# Patient Record
Sex: MALE | Race: WHITE | NOT HISPANIC OR LATINO | Employment: OTHER | URBAN - NONMETROPOLITAN AREA
[De-identification: names, ages, dates, MRNs, and addresses within clinical notes are randomized per-mention and may not be internally consistent; named-entity substitution may affect disease eponyms.]

---

## 2017-06-18 ENCOUNTER — OFFICE VISIT (OUTPATIENT)
Dept: URGENT CARE | Facility: PHYSICIAN GROUP | Age: 47
End: 2017-06-18
Payer: MEDICAID

## 2017-06-18 VITALS
BODY MASS INDEX: 25.61 KG/M2 | OXYGEN SATURATION: 98 % | SYSTOLIC BLOOD PRESSURE: 136 MMHG | TEMPERATURE: 97.3 F | HEIGHT: 68 IN | DIASTOLIC BLOOD PRESSURE: 94 MMHG | RESPIRATION RATE: 14 BRPM | HEART RATE: 80 BPM | WEIGHT: 169 LBS

## 2017-06-18 DIAGNOSIS — G89.4 CHRONIC PAIN SYNDROME: ICD-10-CM

## 2017-06-18 DIAGNOSIS — M62.830 MUSCLE SPASM OF BACK: Primary | ICD-10-CM

## 2017-06-18 PROCEDURE — 99214 OFFICE O/P EST MOD 30 MIN: CPT | Performed by: PHYSICIAN ASSISTANT

## 2017-06-18 RX ORDER — KETOROLAC TROMETHAMINE 30 MG/ML
60 INJECTION, SOLUTION INTRAMUSCULAR; INTRAVENOUS ONCE
Status: COMPLETED | OUTPATIENT
Start: 2017-06-18 | End: 2017-06-18

## 2017-06-18 RX ADMIN — KETOROLAC TROMETHAMINE 60 MG: 30 INJECTION, SOLUTION INTRAMUSCULAR; INTRAVENOUS at 09:59

## 2017-06-18 ASSESSMENT — ENCOUNTER SYMPTOMS
PARESTHESIAS: 0
BACK PAIN: 1
MYALGIAS: 1
PERIANAL NUMBNESS: 0
BOWEL INCONTINENCE: 0
TINGLING: 0
GASTROINTESTINAL NEGATIVE: 1
NUMBNESS: 0

## 2017-06-18 ASSESSMENT — PAIN SCALES - GENERAL: PAINLEVEL: 9=SEVERE PAIN

## 2017-06-18 NOTE — PROGRESS NOTES
Subjective:      Singh Torres is a 47 y.o. male who presents with Back Pain    PMH:  has a past medical history of Elbow injury; Head injury (2004); Migraine with aura; Backpain (1991); Restless leg syndrome; ASTHMA; Vertigo; Heart burn; Indigestion; Snoring; Arthritis; Hemorrhagic disorder (CMS-[facility]); Bowel habit changes; NATALIE (obstructive sleep apnea) (1/30/2012); Pneumonia (2004); Breath shortness; and Dental disorder. He also has no past medical history of Diabetes.  MEDS:   Current outpatient prescriptions:   •  doxycycline (VIBRAMYCIN) 100 MG Tab, Take 1 Tab by mouth 2 times a day., Disp: 20 Tab, Rfl: 0  •  maalox plus-benadryl-visc lidocaine (MAGIC MOUTHWASH), Take 5 mL by mouth every 6 hours as needed., Disp: 90 mL, Rfl: 0  •  amoxicillin-clavulanate (AUGMENTIN) 875-125 MG Tab, Take 1 Tab by mouth 2 times a day., Disp: 20 Tab, Rfl: 0  •  cyclobenzaprine (FLEXERIL) 10 MG Tab, Take 1 Tab by mouth 3 times a day as needed., Disp: 30 Tab, Rfl: 0  •  Olopatadine HCl 0.2 % Solution, 1 Drop by Ophthalmic route 2 Times a Day., Disp: 1 Bottle, Rfl: 1  •  aripiprazole (ABILIFY) 10 MG Tab, Take 10 mg by mouth every day., Disp: , Rfl:   •  hydrocodone/acetaminophen (NORCO)  MG Tab, Take 1 Tab by mouth every four hours as needed. Indications: Moderate to Moderately Severe Pain, Disp: , Rfl:   •  sumatriptan (IMITREX) 100 MG tablet, Take 100 mg by mouth Once PRN for Migraine., Disp: , Rfl:   •  topiramate (TOPAMAX) 50 MG tablet, Take 100 mg by mouth every bedtime., Disp: , Rfl:   •  zolpidem (AMBIEN) 10 MG Tab, Take 10 mg by mouth at bedtime as needed for Sleep., Disp: , Rfl:   •  Multiple Vitamins-Minerals (MULTIVITAMIN GUMMIES ADULT PO), Take 2 Tabs by mouth every day., Disp: , Rfl:   •  Multiple Vitamins-Minerals (AIRBORNE) Chew Tab, Take 2 Tabs by mouth every day., Disp: , Rfl:   •  Ibuprofen-Diphenhydramine Cit (ADVIL PM PO), Take 1 Tab by mouth as needed (Pt states for sleep)., Disp: , Rfl:   •  meclizine  (ANTIVERT) 25 MG Tab, Take 1 Tab by mouth 3 times a day as needed., Disp: 30 Tab, Rfl: 0  •  ondansetron (ZOFRAN) 4 MG TABS, Take 1 Tab by mouth every four hours as needed for Nausea/Vomiting., Disp: 20 Tab, Rfl: 0  •  Lidocaine HCl 0.5 % GEL, Apply 1 Application to affected area(s) 3 times a day. Pt uses it for mouth sores., Disp: , Rfl:   •  tramadol (ULTRAM) 50 MG TABS, Take  mg by mouth every four hours as needed for Moderate Pain., Disp: , Rfl:   •  pramipexole (MIRAPEX) 0.25 MG TABS, Take 0.25 mg by mouth every bedtime., Disp: , Rfl:   •  diclofenac EC (VOLTAREN) 75 MG Tablet Delayed Response, Take 1 Tab by mouth 2 times a day as needed (pain)., Disp: 30 Tab, Rfl: 0  •  albuterol (PROVENTIL) 2.5mg/3ml Nebu Soln solution for nebulization, 2.5 mg by Nebulization route every four hours as needed for Shortness of Breath., Disp: , Rfl:   •  albuterol (VENTOLIN OR PROVENTIL) 108 (90 BASE) MCG/ACT Aero Soln inhalation aerosol, Inhale 2 Puffs by mouth every four hours as needed for Shortness of Breath., Disp: , Rfl:   ALLERGIES: No Known Allergies  SURGHX:   Past Surgical History   Procedure Laterality Date   • Other  2006     Nasal - deviated septum   • Other  2007     colonoscopy   • Turbinoplasty Right 6/28/2016     Procedure: TURBINOPLASTY;  Surgeon: Zaki Yu M.D.;  Location: SURGERY SAME DAY [facility];  Service:      SOCHX:  reports that he has never smoked. He has never used smokeless tobacco. He reports that he does not drink alcohol or use illicit drugs.  FH: family history includes Cancer in his paternal grandmother. Reviewed with patient/family. Not pertinent to this complaint.            HPI Comments: Patient presents with:  Back Pain: x today / Lower mid back pain . Pt states he sneezed this morning and felt his back seize up.  PT denies incontinence or change in BLE sensation.  PT states he has had a toradol shot before when this has happened with significant relief.  Would like one today.  " Pt states he does not want any other medication prescription.          Back Pain  This is a new problem. The current episode started today. The problem occurs constantly. The problem is unchanged. The pain is present in the lumbar spine. The quality of the pain is described as cramping, burning and shooting. The pain does not radiate. The pain is at a severity of 7/10. The symptoms are aggravated by bending, position, coughing and twisting. Pertinent negatives include no bladder incontinence, bowel incontinence, dysuria, numbness, paresthesias, perianal numbness or tingling. Risk factors include lack of exercise. He has tried ice and heat for the symptoms. The treatment provided no relief.       Review of Systems   Gastrointestinal: Negative.  Negative for bowel incontinence.   Genitourinary: Negative.  Negative for bladder incontinence and dysuria.   Musculoskeletal: Positive for myalgias and back pain.   Neurological: Negative for tingling, numbness and paresthesias.   All other systems reviewed and are negative.         Objective:     /94 mmHg  Pulse 80  Temp(Src) 36.3 °C (97.3 °F)  Resp 14  Ht 1.727 m (5' 8\")  Wt 76.658 kg (169 lb)  BMI 25.70 kg/m2  SpO2 98%     Physical Exam   Constitutional: He is oriented to person, place, and time. He appears well-developed and well-nourished. No distress.   HENT:   Head: Normocephalic.   Eyes: Pupils are equal, round, and reactive to light.   Neck: Normal range of motion. Neck supple.   Cardiovascular: Normal rate, regular rhythm and normal heart sounds.    Pulmonary/Chest: Effort normal and breath sounds normal.   Abdominal: Soft. Bowel sounds are normal.   Musculoskeletal:        Lumbar back: He exhibits decreased range of motion (Secondary to pain/spasm), tenderness, pain and spasm. He exhibits no bony tenderness and normal pulse.        Back:    DISTAL N/V INTACT TO BLE, NO SADDLE ANESTHESIA NOTED, NO MIDLINE TTP TO ENTIRE SPINE.    Neurological: He is " alert and oriented to person, place, and time. He has normal reflexes. He exhibits normal muscle tone. Coordination normal.   Skin: Skin is warm and dry.   Psychiatric: He has a normal mood and affect.   Nursing note and vitals reviewed.         Assessment/Plan:     1. Muscle spasm of back  ketorolac (TORADOL) injection 60 mg   2. Chronic pain syndrome       PT to continue taking prescription medications as prescribed.      PT should follow up with PCP in 1-2 days for re-evaluation if symptoms have not improved.  Discussed red flags and reasons to return to UC or ED.  Pt and/or family verbalized understanding of diagnosis and follow up instructions and was given informational handout on diagnosis.  PT discharged.

## 2017-06-18 NOTE — MR AVS SNAPSHOT
"        Singh Torres   2017 9:10 AM   Office Visit   MRN: [id number]    Department:  [facility]   Dept Phone:  [phone]    Description:  Male : 1970   Provider:  Katlyn Dale PA-C           Reason for Visit     Back Pain x today / Lower mid back pain /       Allergies as of 2017     No Known Allergies      You were diagnosed with     Lumbosacral strain, initial encounter   [[id number]]         Vital Signs     Blood Pressure Pulse Temperature Respirations Height Weight    136/94 mmHg 80 36.3 °C (97.3 °F) 14 1.727 m (5' 8\") 76.658 kg (169 lb)    Body Mass Index Oxygen Saturation Smoking Status             25.70 kg/m2 98% Never Smoker          Basic Information     Date Of Birth Sex Race Ethnicity Preferred Language    1970 Male White Non- English      Problem List              ICD-10-CM Priority Class Noted - Resolved    BPPV (benign paroxysmal positional vertigo) H81.10   3/5/2011 - Present    Chronic pain G89.29   3/5/2011 - Present    NATALIE (obstructive sleep apnea) G47.33   2012 - Present    Allergic rhinitis    2012 - Present    Fibromyalgia M79.7   2012 - Present    Hypertrophy, nasal, turbinate J34.3   2016 - Present      Health Maintenance        Date Due Completion Dates    IMM DTaP/Tdap/Td Vaccine (1 - Tdap) 1988 ---            Current Immunizations     INFLUENZA VACCINE H1N1 2009  1:08 PM    Influenza Vaccine 10/8/2009  3:50 PM      Below and/or attached are the medications your provider expects you to take. Review all of your home medications and newly ordered medications with your provider and/or pharmacist. Follow medication instructions as directed by your provider and/or pharmacist. Please keep your medication list with you and share with your provider. Update the information when medications are discontinued, doses are changed, or new medications (including over-the-counter products) are added; and carry medication information " at all times in the event of emergency situations     Allergies:  No Known Allergies          Medications  Valid as of: June 18, 2017 -  9:50 AM    Generic Name Brand Name Tablet Size Instructions for use    Albuterol Sulfate (Nebu Soln) PROVENTIL 2.5mg/3ml 2.5 mg by Nebulization route every four hours as needed for Shortness of Breath.        Albuterol Sulfate (Aero Soln) albuterol 108 (90 BASE) MCG/ACT Inhale 2 Puffs by mouth every four hours as needed for Shortness of Breath.        Amoxicillin-Pot Clavulanate (Tab) AUGMENTIN 875-125 MG Take 1 Tab by mouth 2 times a day.        ARIPiprazole (Tab) ABILIFY 10 MG Take 10 mg by mouth every day.        Cyclobenzaprine HCl (Tab) FLEXERIL 10 MG Take 1 Tab by mouth 3 times a day as needed.        Diclofenac Sodium (Tablet Delayed Response) VOLTAREN 75 MG Take 1 Tab by mouth 2 times a day as needed (pain).        Doxycycline Hyclate (Tab) VIBRAMYCIN 100 MG Take 1 Tab by mouth 2 times a day.        Hydrocodone-Acetaminophen (Tab) NORCO  MG Take 1 Tab by mouth every four hours as needed. Indications: Moderate to Moderately Severe Pain        Ibuprofen-Diphenhydramine Cit   Take 1 Tab by mouth as needed (Pt states for sleep).        Lidocaine HCl (Gel) Lidocaine HCl 0.5 % Apply 1 Application to affected area(s) 3 times a day. Pt uses it for mouth sores.        maalox plus-benadryl-visc lidocaine (MAGIC MOUTHWASH) MAGIC MOUTHWASH  Take 5 mL by mouth every 6 hours as needed.        Meclizine HCl (Tab) ANTIVERT 25 MG Take 1 Tab by mouth 3 times a day as needed.        Multiple Vitamins-Minerals   Take 2 Tabs by mouth every day.        Multiple Vitamins-Minerals (Chew Tab) AIRBORNE  Take 2 Tabs by mouth every day.        Olopatadine HCl (Solution) Olopatadine HCl 0.2 % 1 Drop by Ophthalmic route 2 Times a Day.        Ondansetron HCl (Tab) ZOFRAN 4 MG Take 1 Tab by mouth every four hours as needed for Nausea/Vomiting.        Pramipexole Dihydrochloride (Tab) MIRAPEX 0.25 MG  Take 0.25 mg by mouth every bedtime.        SUMAtriptan Succinate (Tab) IMITREX 100 MG Take 100 mg by mouth Once PRN for Migraine.        Topiramate (Tab) TOPAMAX 50 MG Take 100 mg by mouth every bedtime.        TraMADol HCl (Tab) ULTRAM 50 MG Take  mg by mouth every four hours as needed for Moderate Pain.        Zolpidem Tartrate (Tab) AMBIEN 10 MG Take 10 mg by mouth at bedtime as needed for Sleep.        .                 Medicines prescribed today were sent to:     [facility] - [place], [place] - [address]    [address] [place] [address]    Phone: [phone] Fax: [phone]    Open 24 Hours?: No    [facility], [place] - [address]    [address] [place] [address]    Phone: [phone] Fax: [phone]    Open 24 Hours?: Yes    [facility] - [place], [place] - [address]    [address] [place] [address]    Phone: [phone] Fax: [phone]    Open 24 Hours?: No      Medication refill instructions:       If your prescription bottle indicates you have medication refills left, it is not necessary to call your provider’s office. Please contact your pharmacy and they will refill your medication.    If your prescription bottle indicates you do not have any refills left, you may request refills at any time through one of the following ways: The online TipHive system (except Urgent Care), by calling your provider’s office, or by asking your pharmacy to contact your provider’s office with a refill request. Medication refills are processed only during regular business hours and may not be available until the next business day. Your provider may request additional information or to have a follow-up visit with you prior to refilling your medication.   *Please Note: Medication refills are assigned a new Rx number when refilled electronically. Your pharmacy may indicate that no  refills were authorized even though a new prescription for the same medication is available at the pharmacy. Please request the medicine by name with the pharmacy before contacting your provider for a refill.           Prepmatic Access Code: [email or web address]  Expires: 7/18/2017  9:50 AM    Prepmatic  A secure, online tool to manage your health information     [employer]’s Prepmatic® is a secure, online tool that connects you to your personalized health information from the privacy of your home -- day or night - making it very easy for you to manage your healthcare. Once the activation process is completed, you can even access your medical information using the Prepmatic nigel, which is available for free in the Apple Nigel store or Google Play store.     Prepmatic provides the following levels of access (as shown below):   My Chart Features   [facility] Doctor [facility]  Specialists [facility]  Urgent  Care Non-[facility]  Primary Care  Doctor   Email your healthcare team securely and privately 24/7 X X X    Manage appointments: schedule your next appointment; view details of past/upcoming appointments X      Request prescription refills. X      View recent personal medical records, including lab and immunizations X X X X   View health record, including health history, allergies, medications X X X X   Read reports about your outpatient visits, procedures, consult and ER notes X X X X   See your discharge summary, which is a recap of your hospital and/or ER visit that includes your diagnosis, lab results, and care plan. X X       How to register for Prepmatic:  1. Go to  [email or web address].  2. Click on the Sign Up Now box, which takes you to the New Member Sign Up page. You will need to provide the following information:  a. Enter your Prepmatic Access Code exactly as it appears at the top of this page. (You will not need to use this code after you’ve completed the sign-up process. If you do not sign up before the  expiration date, you must request a new code.)   b. Enter your date of birth.   c. Enter your home email address.   d. Click Submit, and follow the next screen’s instructions.  3. Create a [employer] ID. This will be your [employer] login ID and cannot be changed, so think of one that is secure and easy to remember.  4. Create a [employer] password. You can change your password at any time.  5. Enter your Password Reset Question and Answer. This can be used at a later time if you forget your password.   6. Enter your e-mail address. This allows you to receive e-mail notifications when new information is available in [employer].  7. Click Sign Up. You can now view your health information.    For assistance activating your [employer] account, call [phone]

## 2017-07-02 ENCOUNTER — OFFICE VISIT (OUTPATIENT)
Dept: URGENT CARE | Facility: PHYSICIAN GROUP | Age: 47
End: 2017-07-02
Payer: MEDICAID

## 2017-07-02 VITALS
HEIGHT: 68 IN | WEIGHT: 167 LBS | HEART RATE: 90 BPM | TEMPERATURE: 98.2 F | BODY MASS INDEX: 25.31 KG/M2 | OXYGEN SATURATION: 94 % | DIASTOLIC BLOOD PRESSURE: 90 MMHG | RESPIRATION RATE: 16 BRPM | SYSTOLIC BLOOD PRESSURE: 126 MMHG

## 2017-07-02 DIAGNOSIS — G89.29 ACUTE EXACERBATION OF CHRONIC LOW BACK PAIN: ICD-10-CM

## 2017-07-02 DIAGNOSIS — M54.50 ACUTE EXACERBATION OF CHRONIC LOW BACK PAIN: ICD-10-CM

## 2017-07-02 PROCEDURE — 99214 OFFICE O/P EST MOD 30 MIN: CPT | Performed by: PHYSICIAN ASSISTANT

## 2017-07-02 RX ORDER — KETOROLAC TROMETHAMINE 30 MG/ML
60 INJECTION, SOLUTION INTRAMUSCULAR; INTRAVENOUS ONCE
Status: COMPLETED | OUTPATIENT
Start: 2017-07-02 | End: 2017-07-02

## 2017-07-02 RX ADMIN — KETOROLAC TROMETHAMINE 60 MG: 30 INJECTION, SOLUTION INTRAMUSCULAR; INTRAVENOUS at 12:38

## 2017-07-02 NOTE — PROGRESS NOTES
Chief Complaint   Patient presents with   • Back Pain     LLQ X 1 day non-work related       HISTORY OF PRESENT ILLNESS: Patient is a 47 y.o. male who presents today for a Toradol injection. Patient receives chronic pain medication from his primary care provider. He is waiting to get into a back specialist to hopefully have injections as the pain medication is not helping very much. Patient states he sneezed yesterday, causing his chronic low back pain to acutely worsen. Patient denies any changes in his chronic symptoms including lower extremity weakness, saddle anesthesia, bowel/bladder incontinence. Patient has had Toradol injections in the past and they helped take the edge off.    Patient Active Problem List    Diagnosis Date Noted   • Hypertrophy, nasal, turbinate 06/28/2016   • NATALIE (obstructive sleep apnea) 01/30/2012   • Allergic rhinitis 01/30/2012   • Fibromyalgia 01/30/2012   • BPPV (benign paroxysmal positional vertigo) 03/05/2011   • Chronic pain 03/05/2011       Allergies:Review of patient's allergies indicates no known allergies.    Current Outpatient Prescriptions Ordered in [facility]   Medication Sig Dispense Refill   • doxycycline (VIBRAMYCIN) 100 MG Tab Take 1 Tab by mouth 2 times a day. 20 Tab 0   • maalox plus-benadryl-visc lidocaine (MAGIC MOUTHWASH) Take 5 mL by mouth every 6 hours as needed. 90 mL 0   • amoxicillin-clavulanate (AUGMENTIN) 875-125 MG Tab Take 1 Tab by mouth 2 times a day. 20 Tab 0   • cyclobenzaprine (FLEXERIL) 10 MG Tab Take 1 Tab by mouth 3 times a day as needed. 30 Tab 0   • Olopatadine HCl 0.2 % Solution 1 Drop by Ophthalmic route 2 Times a Day. 1 Bottle 1   • diclofenac EC (VOLTAREN) 75 MG Tablet Delayed Response Take 1 Tab by mouth 2 times a day as needed (pain). 30 Tab 0   • aripiprazole (ABILIFY) 10 MG Tab Take 10 mg by mouth every day.     • albuterol (PROVENTIL) 2.5mg/3ml Nebu Soln solution for nebulization 2.5 mg by Nebulization route every four hours as needed for  Shortness of Breath.     • hydrocodone/acetaminophen (NORCO)  MG Tab Take 1 Tab by mouth every four hours as needed. Indications: Moderate to Moderately Severe Pain     • albuterol (VENTOLIN OR PROVENTIL) 108 (90 BASE) MCG/ACT Aero Soln inhalation aerosol Inhale 2 Puffs by mouth every four hours as needed for Shortness of Breath.     • sumatriptan (IMITREX) 100 MG tablet Take 100 mg by mouth Once PRN for Migraine.     • topiramate (TOPAMAX) 50 MG tablet Take 100 mg by mouth every bedtime.     • zolpidem (AMBIEN) 10 MG Tab Take 10 mg by mouth at bedtime as needed for Sleep.     • Multiple Vitamins-Minerals (MULTIVITAMIN GUMMIES ADULT PO) Take 2 Tabs by mouth every day.     • Multiple Vitamins-Minerals (AIRBORNE) Chew Tab Take 2 Tabs by mouth every day.     • Ibuprofen-Diphenhydramine Cit (ADVIL PM PO) Take 1 Tab by mouth as needed (Pt states for sleep).     • meclizine (ANTIVERT) 25 MG Tab Take 1 Tab by mouth 3 times a day as needed. 30 Tab 0   • ondansetron (ZOFRAN) 4 MG TABS Take 1 Tab by mouth every four hours as needed for Nausea/Vomiting. 20 Tab 0   • Lidocaine HCl 0.5 % GEL Apply 1 Application to affected area(s) 3 times a day. Pt uses it for mouth sores.     • tramadol (ULTRAM) 50 MG TABS Take  mg by mouth every four hours as needed for Moderate Pain.     • pramipexole (MIRAPEX) 0.25 MG TABS Take 0.25 mg by mouth every bedtime.       Current Facility-Administered Medications Ordered in Epic   Medication Dose Route Frequency Provider Last Rate Last Dose   • ketorolac (TORADOL) injection 60 mg  60 mg Intramuscular Once Desi Chin PA-C           Past Medical History   Diagnosis Date   • Elbow injury    • Head injury 2004   • Migraine with aura    • Backpain 1991   • Restless leg syndrome    • ASTHMA    • Vertigo    • Heart burn    • Indigestion    • Snoring    • Arthritis      back, arm   • Hemorrhagic disorder (CMS-HCC)      gums   • Bowel habit changes      diarrhea   • NATALIE (obstructive sleep  "apnea) 2012     bipap   • Pneumonia    • Breath shortness      in the past   • Dental disorder      rotting away       Social History   Substance Use Topics   • Smoking status: Never Smoker    • Smokeless tobacco: Never Used   • Alcohol Use: No      Comment: occ       Family Status   Relation Status Death Age   • Mother Alive    • Father Alive    • Paternal Grandmother       Family History   Problem Relation Age of Onset   • Cancer Paternal Grandmother      breast       ROS:   Review of Systems   Constitutional: Negative for fever, chills, weight loss and malaise/fatigue.   HENT: Negative for ear pain, nosebleeds, congestion, sore throat and neck pain.    Eyes: Negative for blurred vision.   Respiratory: Negative for cough, sputum production, shortness of breath and wheezing.    Cardiovascular: Negative for chest pain, palpitations, orthopnea and leg swelling.   Gastrointestinal: Negative for heartburn, nausea, vomiting and abdominal pain.   Genitourinary: Negative for dysuria, urgency and frequency.       Exam:  Blood pressure 126/90, pulse 90, temperature 36.8 °C (98.2 °F), resp. rate 16, height 1.727 m (5' 8\"), weight 75.751 kg (167 lb), SpO2 94 %.  General: Normal appearing. No distress.  HEENT: Head is grossly normal.  Pulmonary: No respiratory distress noted.  Back: Decreased range of motion due to pain.  Extremities: No motor deficit noted.  Skin: No obvious lesions.  Psych: Normal mood. Alert and oriented x3. Judgment and insight is normal.    60 mg IM    Assessment/Plan:  Continue chronic pain medication as directed. Follow-up with primary care provider and back specialist as scheduled. Discussed ER precautions.  1. Acute exacerbation of chronic low back pain  ketorolac (TORADOL) injection 60 mg         "

## 2017-07-02 NOTE — MR AVS SNAPSHOT
"        Singh Torres   2017 10:35 AM   Office Visit   MRN: [id number]    Department:  [facility]   Dept Phone:  [phone]    Description:  Male : 1970   Provider:  Desi Chin PA-C           Reason for Visit     Back Pain LLQ X 1 day non-work related      Allergies as of 2017     No Known Allergies      You were diagnosed with     Acute exacerbation of chronic low back pain   [[id number]]         Vital Signs     Blood Pressure Pulse Temperature Respirations Height Weight    126/90 mmHg 90 36.8 °C (98.2 °F) 16 1.727 m (5' 8\") 75.751 kg (167 lb)    Body Mass Index Oxygen Saturation Smoking Status             25.40 kg/m2 94% Never Smoker          Basic Information     Date Of Birth Sex Race Ethnicity Preferred Language    1970 Male White Non- English      Problem List              ICD-10-CM Priority Class Noted - Resolved    BPPV (benign paroxysmal positional vertigo) H81.10   3/5/2011 - Present    Chronic pain G89.29   3/5/2011 - Present    NATALIE (obstructive sleep apnea) G47.33   2012 - Present    Allergic rhinitis    2012 - Present    Fibromyalgia M79.7   2012 - Present    Hypertrophy, nasal, turbinate J34.3   2016 - Present      Health Maintenance        Date Due Completion Dates    IMM DTaP/Tdap/Td Vaccine (1 - Tdap) 1988 ---    IMM INFLUENZA (1) 2017 ---            Current Immunizations     INFLUENZA VACCINE H1N1 2009  1:08 PM    Influenza Vaccine 10/8/2009  3:50 PM      Below and/or attached are the medications your provider expects you to take. Review all of your home medications and newly ordered medications with your provider and/or pharmacist. Follow medication instructions as directed by your provider and/or pharmacist. Please keep your medication list with you and share with your provider. Update the information when medications are discontinued, doses are changed, or new medications (including over-the-counter products) are " added; and carry medication information at all times in the event of emergency situations     Allergies:  No Known Allergies          Medications  Valid as of: July 02, 2017 - 11:03 AM    Generic Name Brand Name Tablet Size Instructions for use    Albuterol Sulfate (Nebu Soln) PROVENTIL 2.5mg/3ml 2.5 mg by Nebulization route every four hours as needed for Shortness of Breath.        Albuterol Sulfate (Aero Soln) albuterol 108 (90 BASE) MCG/ACT Inhale 2 Puffs by mouth every four hours as needed for Shortness of Breath.        Amoxicillin-Pot Clavulanate (Tab) AUGMENTIN 875-125 MG Take 1 Tab by mouth 2 times a day.        ARIPiprazole (Tab) ABILIFY 10 MG Take 10 mg by mouth every day.        Cyclobenzaprine HCl (Tab) FLEXERIL 10 MG Take 1 Tab by mouth 3 times a day as needed.        Diclofenac Sodium (Tablet Delayed Response) VOLTAREN 75 MG Take 1 Tab by mouth 2 times a day as needed (pain).        Doxycycline Hyclate (Tab) VIBRAMYCIN 100 MG Take 1 Tab by mouth 2 times a day.        Hydrocodone-Acetaminophen (Tab) NORCO  MG Take 1 Tab by mouth every four hours as needed. Indications: Moderate to Moderately Severe Pain        Ibuprofen-Diphenhydramine Cit   Take 1 Tab by mouth as needed (Pt states for sleep).        Lidocaine HCl (Gel) Lidocaine HCl 0.5 % Apply 1 Application to affected area(s) 3 times a day. Pt uses it for mouth sores.        maalox plus-benadryl-visc lidocaine (MAGIC MOUTHWASH) MAGIC MOUTHWASH  Take 5 mL by mouth every 6 hours as needed.        Meclizine HCl (Tab) ANTIVERT 25 MG Take 1 Tab by mouth 3 times a day as needed.        Multiple Vitamins-Minerals   Take 2 Tabs by mouth every day.        Multiple Vitamins-Minerals (Chew Tab) AIRBORNE  Take 2 Tabs by mouth every day.        Olopatadine HCl (Solution) Olopatadine HCl 0.2 % 1 Drop by Ophthalmic route 2 Times a Day.        Ondansetron HCl (Tab) ZOFRAN 4 MG Take 1 Tab by mouth every four hours as needed for Nausea/Vomiting.         Pramipexole Dihydrochloride (Tab) MIRAPEX 0.25 MG Take 0.25 mg by mouth every bedtime.        SUMAtriptan Succinate (Tab) IMITREX 100 MG Take 100 mg by mouth Once PRN for Migraine.        Topiramate (Tab) TOPAMAX 50 MG Take 100 mg by mouth every bedtime.        TraMADol HCl (Tab) ULTRAM 50 MG Take  mg by mouth every four hours as needed for Moderate Pain.        Zolpidem Tartrate (Tab) AMBIEN 10 MG Take 10 mg by mouth at bedtime as needed for Sleep.        .                 Medicines prescribed today were sent to:     [facility] - [place], [place] - [address]    [address] [place] [address]    Phone: [phone] Fax: [phone]    Open 24 Hours?: No    [email or web address], [place] - [address] AT [address] [address] [place]    [address] [address]    Phone: [phone] Fax: [phone]    Open 24 Hours?: Yes    [email or web address] [place] - [address] [facility] [address] [place]    [address] [place] [address]    Phone: [phone] Fax: [phone]    Open 24 Hours?: No      Medication refill instructions:       If your prescription bottle indicates you have medication refills left, it is not necessary to call your provider’s office. Please contact your pharmacy and they will refill your medication.    If your prescription bottle indicates you do not have any refills left, you may request refills at any time through one of the following ways: The online Zipmark system (except Urgent Care), by calling your provider’s office, or by asking your pharmacy to contact your provider’s office with a refill request. Medication refills are processed only during regular business hours and may not be available until the next business day. Your provider may request additional information or to have a follow-up visit with you prior to refilling your medication.   *Please Note: Medication refills are assigned a new Rx number when refilled  electronically. Your pharmacy may indicate that no refills were authorized even though a new prescription for the same medication is available at the pharmacy. Please request the medicine by name with the pharmacy before contacting your provider for a refill.           [employer] Classroom Access Code: [email or web address]  Expires: 7/18/2017  9:50 AM    [employer] Classroom  A secure, online tool to manage your health information     [employer]’s [employer]o Classroom® is a secure, online tool that connects you to your personalized health information from the privacy of your home -- day or night - making it very easy for you to manage your healthcare. Once the activation process is completed, you can even access your medical information using the Apreso Classroom nigel, which is available for free in the Apple Nigel store or Google Play store.     [employer] Classroom provides the following levels of access (as shown below):   My Chart Features   [facility] Doctor [facility]  Specialists [facility]  Urgent  Care Non-[facility]  Primary Care  Doctor   Email your healthcare team securely and privately 24/7 X X X    Manage appointments: schedule your next appointment; view details of past/upcoming appointments X      Request prescription refills. X      View recent personal medical records, including lab and immunizations X X X X   View health record, including health history, allergies, medications X X X X   Read reports about your outpatient visits, procedures, consult and ER notes X X X X   See your discharge summary, which is a recap of your hospital and/or ER visit that includes your diagnosis, lab results, and care plan. X X       How to register for Apreso Classroom:  1. Go to  [email or web address].  2. Click on the Sign Up Now box, which takes you to the New Member Sign Up page. You will need to provide the following information:  a. Enter your Apreso Classroom Access Code exactly as it appears at the top of this page. (You will not need to use this code after you’ve completed the sign-up  process. If you do not sign up before the expiration date, you must request a new code.)   b. Enter your date of birth.   c. Enter your home email address.   d. Click Submit, and follow the next screen’s instructions.  3. Create a "[employer]t ID. This will be your "[employer]t login ID and cannot be changed, so think of one that is secure and easy to remember.  4. Create a "[employer]t password. You can change your password at any time.  5. Enter your Password Reset Question and Answer. This can be used at a later time if you forget your password.   6. Enter your e-mail address. This allows you to receive e-mail notifications when new information is available in Search[employer].  7. Click Sign Up. You can now view your health information.    For assistance activating your [employer] account, call [phone]

## 2017-08-31 ENCOUNTER — OFFICE VISIT (OUTPATIENT)
Dept: URGENT CARE | Facility: PHYSICIAN GROUP | Age: 47
End: 2017-08-31
Payer: MEDICAID

## 2017-08-31 VITALS
OXYGEN SATURATION: 98 % | SYSTOLIC BLOOD PRESSURE: 110 MMHG | WEIGHT: 169 LBS | HEART RATE: 78 BPM | TEMPERATURE: 98.6 F | HEIGHT: 69 IN | BODY MASS INDEX: 25.03 KG/M2 | RESPIRATION RATE: 18 BRPM | DIASTOLIC BLOOD PRESSURE: 80 MMHG

## 2017-08-31 DIAGNOSIS — M62.838 MUSCLE SPASM: ICD-10-CM

## 2017-08-31 DIAGNOSIS — G89.29 CHRONIC BILATERAL LOW BACK PAIN WITHOUT SCIATICA: ICD-10-CM

## 2017-08-31 DIAGNOSIS — M54.50 CHRONIC BILATERAL LOW BACK PAIN WITHOUT SCIATICA: ICD-10-CM

## 2017-08-31 DIAGNOSIS — M75.22 BICEPS TENDONITIS, LEFT: ICD-10-CM

## 2017-08-31 DIAGNOSIS — M25.512 ACUTE PAIN OF LEFT SHOULDER: ICD-10-CM

## 2017-08-31 PROCEDURE — 99213 OFFICE O/P EST LOW 20 MIN: CPT | Performed by: PHYSICIAN ASSISTANT

## 2017-08-31 RX ORDER — GABAPENTIN 300 MG/1
300 CAPSULE ORAL 3 TIMES DAILY
COMMUNITY

## 2017-08-31 RX ORDER — KETOROLAC TROMETHAMINE 30 MG/ML
60 INJECTION, SOLUTION INTRAMUSCULAR; INTRAVENOUS ONCE
Status: COMPLETED | OUTPATIENT
Start: 2017-08-31 | End: 2017-08-31

## 2017-08-31 RX ADMIN — KETOROLAC TROMETHAMINE 60 MG: 30 INJECTION, SOLUTION INTRAMUSCULAR; INTRAVENOUS at 13:05

## 2017-08-31 ASSESSMENT — ENCOUNTER SYMPTOMS
RESPIRATORY NEGATIVE: 1
BACK PAIN: 1
EYES NEGATIVE: 1
CONSTITUTIONAL NEGATIVE: 1
TINGLING: 0
SENSORY CHANGE: 0
CARDIOVASCULAR NEGATIVE: 1
PSYCHIATRIC NEGATIVE: 1
NECK PAIN: 1
GASTROINTESTINAL NEGATIVE: 1
FOCAL WEAKNESS: 0

## 2017-08-31 ASSESSMENT — PAIN SCALES - GENERAL: PAINLEVEL: 9=SEVERE PAIN

## 2017-08-31 NOTE — PATIENT INSTRUCTIONS
ICe the shoulder.  Pendulum Range of motion exercises.  NSAIDs after 8pm.  Follow-up with  PCP, consider auto-immune work up.    Shoulder Pain  The shoulder is the joint that connects your arms to your body. The bones that form the shoulder joint include the upper arm bone (humerus), the shoulder blade (scapula), and the collarbone (clavicle). The top of the humerus is shaped like a ball and fits into a rather flat socket on the scapula (glenoid cavity). A combination of muscles and strong, fibrous tissues that connect muscles to bones (tendons) support your shoulder joint and hold the ball in the socket. Small, fluid-filled sacs (bursae) are located in different areas of the joint. They act as cushions between the bones and the overlying soft tissues and help reduce friction between the gliding tendons and the bone as you move your arm. Your shoulder joint allows a wide range of motion in your arm. This range of motion allows you to do things like scratch your back or throw a ball. However, this range of motion also makes your shoulder more prone to pain from overuse and injury.  Causes of shoulder pain can originate from both injury and overuse and usually can be grouped in the following four categories:  · Redness, swelling, and pain (inflammation) of the tendon (tendinitis) or the bursae (bursitis).  · Instability, such as a dislocation of the joint.  · Inflammation of the joint (arthritis).  · Broken bone (fracture).  HOME CARE INSTRUCTIONS   · Apply ice to the sore area.  ¨ Put ice in a plastic bag.  ¨ Place a towel between your skin and the bag.  ¨ Leave the ice on for 15-20 minutes, 3-4 times per day for the first 2 days, or as directed by your health care provider.  · Stop using cold packs if they do not help with the pain.  · If you have a shoulder sling or immobilizer, wear it as long as your caregiver instructs. Only remove it to shower or bathe. Move your arm as little as possible, but keep your hand  moving to prevent swelling.  · Squeeze a soft ball or foam pad as much as possible to help prevent swelling.  · Only take over-the-counter or prescription medicines for pain, discomfort, or fever as directed by your caregiver.  SEEK MEDICAL CARE IF:   · Your shoulder pain increases, or new pain develops in your arm, hand, or fingers.  · Your hand or fingers become cold and numb.  · Your pain is not relieved with medicines.  SEEK IMMEDIATE MEDICAL CARE IF:   · Your arm, hand, or fingers are numb or tingling.  · Your arm, hand, or fingers are significantly swollen or turn white or blue.  MAKE SURE YOU:   · Understand these instructions.  · Will watch your condition.  · Will get help right away if you are not doing well or get worse.     This information is not intended to replace advice given to you by your health care provider. Make sure you discuss any questions you have with your health care provider.     Document Released: 09/27/2006 Document Revised: 01/08/2016 Document Reviewed: 04/11/2016  [employer] Interactive Patient Education ©2016 [employer]

## 2017-08-31 NOTE — PROGRESS NOTES
Subjective:      Singh Torres is a 47 y.o. male who presents with Shoulder Pain and Low Back Pain            HPI  Chief Complaint   Patient presents with   • Shoulder Pain   • Low Back Pain       HPI:  Singh Torres is a 47 y.o. male who presents with chronic low back pain and shoulder pain.  Is under pain management with PCP.  Toradol injections work.  No saddle anesthesia, bowel or bladder changes, paresthesias, or numbness..  Patient denies HA, SOB, chest pain, palpitations, fever, chills, or n/v/d.      Past Medical History:   Diagnosis Date   • NATALIE (obstructive sleep apnea) 1/30/2012    bipap   • Head injury 2004   • Pneumonia 2004   • Backpain 1991   • Arthritis     back, arm   • ASTHMA    • Bowel habit changes     diarrhea   • Breath shortness     in the past   • Dental disorder     rotting away   • Elbow injury    • Heart burn    • Hemorrhagic disorder (CMS-HCC)     gums   • Indigestion    • Migraine with aura    • Restless leg syndrome    • Snoring    • Vertigo        Past Surgical History:   Procedure Laterality Date   • TURBINOPLASTY Right 6/28/2016    Procedure: TURBINOPLASTY;  Surgeon: Zaki Yu M.D.;  Location: SURGERY SAME DAY [facility];  Service:    • OTHER  2007    colonoscopy   • OTHER  2006    Nasal - deviated septum       Family History   Problem Relation Age of Onset   • Cancer Paternal Grandmother      breast       Social History     Social History   • Marital status:      Spouse name: N/A   • Number of children: N/A   • Years of education: N/A     Occupational History   • Not on file.     Social History Main Topics   • Smoking status: Never Smoker   • Smokeless tobacco: Never Used   • Alcohol use No      Comment: occ   • Drug use: No   • Sexual activity: Yes     Partners: Female     Other Topics Concern   • Not on file     Social History Narrative    ** Merged History Encounter **              Current Outpatient Prescriptions:   •  gabapentin, 300 mg, Oral, TID  •   "aripiprazole, 10 mg, Oral, DAILY  •  albuterol, 2.5 mg, Nebulization, Q4HRS PRN  •  hydrocodone/acetaminophen, 1 Tab, Oral, Q4HRS PRN  •  albuterol, 2 Puff, Inhalation, Q4HRS PRN  •  sumatriptan, 100 mg, Oral, Once PRN  •  topiramate, 100 mg, Oral, QHS  •  zolpidem, 10 mg, Oral, HS PRN  •  Multiple Vitamins-Minerals (MULTIVITAMIN GUMMIES ADULT PO), 2 Tab, Oral, DAILY  •  AIRBORNE, 2 Tab, Oral, DAILY  •  ondansetron, 4 mg, Oral, Q4HRS PRN  •  Lidocaine HCl, 1 Application, Topical, TID  •  pramipexole, 0.25 mg, Oral, QHS  •  doxycycline, 100 mg, Oral, BID, not taking at Unknown time  •  maalox plus-benadryl-visc lidocaine, 5 mL, Oral, Q6HRS PRN, not taking at Unknown time  •  amoxicillin-clavulanate, 1 Tab, Oral, BID, not taking at Unknown time  •  cyclobenzaprine, 10 mg, Oral, TID PRN, not taking at Unknown time  •  Olopatadine HCl, 1 Drop, Ophthalmic, BID, not taking at Unknown time  •  diclofenac EC, 75 mg, Oral, BID PRN, not taking  •  Ibuprofen-Diphenhydramine Cit (ADVIL PM PO), 1 Tab, Oral, PRN, not taking at Unknown time  •  meclizine, 25 mg, Oral, TID PRN, not [id number]  •  tramadol,  mg, Oral, Q4HRS PRN, not taking at 0500    No Known Allergies     Review of Systems   Constitutional: Negative.    HENT: Negative.    Eyes: Negative.    Respiratory: Negative.    Cardiovascular: Negative.    Gastrointestinal: Negative.    Genitourinary: Negative.    Musculoskeletal: Positive for back pain, joint pain and neck pain.   Skin: Negative.    Neurological: Negative for tingling, sensory change and focal weakness.   Endo/Heme/Allergies: Negative.    Psychiatric/Behavioral: Negative.           Objective:     /80   Pulse 78   Temp 37 °C (98.6 °F)   Resp 18   Ht 1.753 m (5' 9\")   Wt 76.7 kg (169 lb)   SpO2 98%   BMI 24.96 kg/m²      Physical Exam       Nursing note and vitals reviewed.    Constitutional:  Appropriately groomed, pleasant affect, well nourished, and in no acute distress.    HEENT:  Head: " Atraumatic, normocephalic.    Ears:  Hearing grossly intact to voice.    Eyes:  Conjunctivae clear, sclera white, and medial canthus without exudate bilaterally.    Lungs:  Lungs with normal respiratory excursion and effort.    Nursing note and vital signs reviewed.    Left shoulder:  No swelling, erythema, ecchymosis, effusion, or atrophy present.  TTP over the AC joint,  supraspinatus muscle, and medial deltoid.  Tenderness over the biceps tendon.  FROM for extension, flexion, abduction, and adduction and internal rotation reaching to L5 and external rotation reaching to the occiput.    Strength 5/5 for resisted abduction, adduction, elbow flexion,  strength, and interdigital strength.  Impingement sign slightly positive.  Negative drop arm test and apprehension test.  Sensation intact to light touch C6-8.      Spine: No ecchymosis or erythema or ecchymosis present.  No step-off deformities bilateral lumbar ttp.    FROM for spinal flexion, extension, lateral flexion, and rotation.    Strength 5/5 for LE bilaterally.    DTR 2+ and equal bilaterally for patella and achilles.    Special tests: Negative Bruno's bilaterally.  Sensation equal bilaterally to light touch for L4, L5, S1, and S2. No clonus or fasiculations noted. Neurovascular status intact, DP 2+.      Gait and station wnl, non antalgic.    Derm:  No rashes or lesions with good turgor pressure.     Psychiatric:  Normal judgement, mood and affect.    Assessment/Plan:     1. Muscle spasm  ketorolac (TORADOL) injection 60 mg   2. Acute pain of left shoulder     3. Biceps tendonitis, left     4. Chronic bilateral low back pain without sciatica       Patient presents with chronic low back pain and now left shoulder pain. Patient has tenderness over the supraspinatus muscle and AC joint. Also having tenderness over the bicipital tendon. No exact mechanism of injury. No focal neurological deficits on exam. Recommended pendulum arm swelling, icing, and rest.  Did administer Toradol injection. Advised patient to follow with PCP for further evaluation.    Patient was in agreement with this treatment plan and seemed to understand without barriers. All questions were encouraged and answered.  Reviewed signs and symptoms of when to seek emergency medical care.     Please note that this dictation was created using voice recognition software.  I have made every reasonable attempt to correct obvious errors, but I expect there are errors of werner and possibly content that I did not discover before finalizing the note.

## 2018-01-11 ENCOUNTER — HOSPITAL ENCOUNTER (OUTPATIENT)
Dept: LAB | Facility: MEDICAL CENTER | Age: 48
End: 2018-01-11
Attending: UROLOGY
Payer: MEDICAID

## 2018-01-11 LAB
ANION GAP SERPL CALC-SCNC: 9 MMOL/L (ref 0–11.9)
BUN SERPL-MCNC: 15 MG/DL (ref 8–22)
CALCIUM SERPL-MCNC: 9.1 MG/DL (ref 8.5–10.5)
CHLORIDE SERPL-SCNC: 110 MMOL/L (ref 96–112)
CO2 SERPL-SCNC: 22 MMOL/L (ref 20–33)
CREAT SERPL-MCNC: 1.08 MG/DL (ref 0.5–1.4)
GLUCOSE SERPL-MCNC: 105 MG/DL (ref 65–99)
POTASSIUM SERPL-SCNC: 3.9 MMOL/L (ref 3.6–5.5)
SODIUM SERPL-SCNC: 141 MMOL/L (ref 135–145)

## 2018-01-11 PROCEDURE — 84153 ASSAY OF PSA TOTAL: CPT

## 2018-01-11 PROCEDURE — 80048 BASIC METABOLIC PNL TOTAL CA: CPT

## 2018-01-11 PROCEDURE — 36415 COLL VENOUS BLD VENIPUNCTURE: CPT

## 2018-01-12 LAB — PSA SERPL-MCNC: 0.72 NG/ML (ref 0–4)

## 2018-05-28 ENCOUNTER — OFFICE VISIT (OUTPATIENT)
Dept: URGENT CARE | Facility: PHYSICIAN GROUP | Age: 48
End: 2018-05-28
Payer: MEDICAID

## 2018-05-28 VITALS
HEART RATE: 78 BPM | BODY MASS INDEX: 23.7 KG/M2 | HEIGHT: 69 IN | TEMPERATURE: 98 F | OXYGEN SATURATION: 98 % | RESPIRATION RATE: 16 BRPM | WEIGHT: 160 LBS | SYSTOLIC BLOOD PRESSURE: 130 MMHG | DIASTOLIC BLOOD PRESSURE: 78 MMHG

## 2018-05-28 DIAGNOSIS — H81.10 BENIGN PAROXYSMAL POSITIONAL VERTIGO, UNSPECIFIED LATERALITY: ICD-10-CM

## 2018-05-28 DIAGNOSIS — R11.0 NAUSEA: ICD-10-CM

## 2018-05-28 PROCEDURE — 99214 OFFICE O/P EST MOD 30 MIN: CPT | Mod: 25 | Performed by: FAMILY MEDICINE

## 2018-05-28 RX ORDER — ONDANSETRON HYDROCHLORIDE 8 MG/1
8 TABLET, FILM COATED ORAL EVERY 8 HOURS PRN
Qty: 15 TAB | Refills: 0 | Status: SHIPPED | OUTPATIENT
Start: 2018-05-28 | End: 2018-08-25

## 2018-05-28 RX ORDER — MECLIZINE HYDROCHLORIDE 25 MG/1
25 TABLET ORAL 3 TIMES DAILY PRN
Qty: 30 TAB | Refills: 0 | Status: SHIPPED | OUTPATIENT
Start: 2018-05-28

## 2018-05-28 RX ORDER — ONDANSETRON 2 MG/ML
4 INJECTION INTRAMUSCULAR; INTRAVENOUS ONCE
Status: COMPLETED | OUTPATIENT
Start: 2018-05-28 | End: 2018-05-28

## 2018-05-28 RX ADMIN — ONDANSETRON 4 MG: 2 INJECTION INTRAMUSCULAR; INTRAVENOUS at 12:58

## 2018-05-28 ASSESSMENT — ENCOUNTER SYMPTOMS
MUSCULOSKELETAL NEGATIVE: 1
WEAKNESS: 0
DIAPHORESIS: 0
PSYCHIATRIC NEGATIVE: 1
WEIGHT LOSS: 0
GASTROINTESTINAL NEGATIVE: 1
BRUISES/BLEEDS EASILY: 0
CHILLS: 0
FEVER: 0
DIZZINESS: 1
HEADACHES: 0
RESPIRATORY NEGATIVE: 1
CARDIOVASCULAR NEGATIVE: 1
EYES NEGATIVE: 1

## 2018-05-28 NOTE — PROGRESS NOTES
"Subjective:      Singh Torres is a 48 y.o. male who presents with Lightheadedness (\"vertigo\" )    Patient since urgent care with acute onset of vertigo yesterday. He states that he has had benign positional paroxysmal vertigo off and on for many years he feels his last output was several years ago. He denies any head injury no new medications denies any ear pain or pressure no fevers or chills he is out of any medications to help with his symptoms. He is status post shoulder surgery several months ago and is recovering well.      HPI  Review of Systems   Constitutional: Negative for chills, diaphoresis, fever, malaise/fatigue and weight loss.   HENT: Negative.    Eyes: Negative.    Respiratory: Negative.    Cardiovascular: Negative.    Gastrointestinal: Negative.    Genitourinary: Negative.    Musculoskeletal: Negative.    Skin: Negative.    Neurological: Positive for dizziness. Negative for weakness and headaches.   Endo/Heme/Allergies: Does not bruise/bleed easily.   Psychiatric/Behavioral: Negative.      PMH:  has a past medical history of Arthritis; ASTHMA; Backpain (1991); Bowel habit changes; Breath shortness; Dental disorder; Elbow injury; Head injury (2004); Heart burn; Hemorrhagic disorder (CMS-HCC); Indigestion; Migraine with aura; NATALIE (obstructive sleep apnea) (1/30/2012); Pneumonia (2004); Restless leg syndrome; Snoring; and Vertigo. He also has no past medical history of Diabetes.  MEDS:   Current Outpatient Prescriptions:   •  gabapentin (NEURONTIN) 300 MG Cap, Take 300 mg by mouth 3 times a day., Disp: , Rfl:   •  albuterol (PROVENTIL) 2.5mg/3ml Nebu Soln solution for nebulization, 2.5 mg by Nebulization route every four hours as needed for Shortness of Breath., Disp: , Rfl:   •  hydrocodone/acetaminophen (NORCO)  MG Tab, Take 1 Tab by mouth every four hours as needed. Indications: Moderate to Moderately Severe Pain, Disp: , Rfl:   •  albuterol (VENTOLIN OR PROVENTIL) 108 (90 BASE) MCG/ACT " Aero Soln inhalation aerosol, Inhale 2 Puffs by mouth every four hours as needed for Shortness of Breath., Disp: , Rfl:   •  sumatriptan (IMITREX) 100 MG tablet, Take 100 mg by mouth Once PRN for Migraine., Disp: , Rfl:   •  topiramate (TOPAMAX) 50 MG tablet, Take 100 mg by mouth every bedtime., Disp: , Rfl:   •  zolpidem (AMBIEN) 10 MG Tab, Take 10 mg by mouth at bedtime as needed for Sleep., Disp: , Rfl:   •  Multiple Vitamins-Minerals (MULTIVITAMIN GUMMIES ADULT PO), Take 2 Tabs by mouth every day., Disp: , Rfl:   •  Multiple Vitamins-Minerals (AIRBORNE) Chew Tab, Take 2 Tabs by mouth every day., Disp: , Rfl:   •  pramipexole (MIRAPEX) 0.25 MG TABS, Take 0.25 mg by mouth every bedtime., Disp: , Rfl:   •  doxycycline (VIBRAMYCIN) 100 MG Tab, Take 1 Tab by mouth 2 times a day., Disp: 20 Tab, Rfl: 0  •  maalox plus-benadryl-visc lidocaine (MAGIC MOUTHWASH), Take 5 mL by mouth every 6 hours as needed., Disp: 90 mL, Rfl: 0  •  amoxicillin-clavulanate (AUGMENTIN) 875-125 MG Tab, Take 1 Tab by mouth 2 times a day., Disp: 20 Tab, Rfl: 0  •  cyclobenzaprine (FLEXERIL) 10 MG Tab, Take 1 Tab by mouth 3 times a day as needed., Disp: 30 Tab, Rfl: 0  •  Olopatadine HCl 0.2 % Solution, 1 Drop by Ophthalmic route 2 Times a Day., Disp: 1 Bottle, Rfl: 1  •  diclofenac EC (VOLTAREN) 75 MG Tablet Delayed Response, Take 1 Tab by mouth 2 times a day as needed (pain)., Disp: 30 Tab, Rfl: 0  •  aripiprazole (ABILIFY) 10 MG Tab, Take 10 mg by mouth every day., Disp: , Rfl:   •  Ibuprofen-Diphenhydramine Cit (ADVIL PM PO), Take 1 Tab by mouth as needed (Pt states for sleep)., Disp: , Rfl:   •  meclizine (ANTIVERT) 25 MG Tab, Take 1 Tab by mouth 3 times a day as needed., Disp: 30 Tab, Rfl: 0  •  ondansetron (ZOFRAN) 4 MG TABS, Take 1 Tab by mouth every four hours as needed for Nausea/Vomiting., Disp: 20 Tab, Rfl: 0  •  Lidocaine HCl 0.5 % GEL, Apply 1 Application to affected area(s) 3 times a day. Pt uses it for mouth sores., Disp: , Rfl:  "  •  tramadol (ULTRAM) 50 MG TABS, Take  mg by mouth every four hours as needed for Moderate Pain., Disp: , Rfl:   ALLERGIES: No Known Allergies  SURGHX:   Past Surgical History:   Procedure Laterality Date   • TURBINOPLASTY Right 6/28/2016    Procedure: TURBINOPLASTY;  Surgeon: Zaki Yu M.D.;  Location: SURGERY SAME DAY [facility];  Service:    • OTHER  2007    colonoscopy   • OTHER  2006    Nasal - deviated septum   SOCHX:  reports that he has never smoked. He has never used smokeless tobacco. He reports that he does not drink alcohol or use drugs.  FH: Family history was reviewed, no pertinent findings to report     Objective:     /78   Pulse 78   Temp 36.7 °C (98 °F)   Resp 16   Ht 1.753 m (5' 9\")   Wt 72.6 kg (160 lb)   SpO2 98%   BMI 23.63 kg/m²      Physical Exam   Constitutional: He is oriented to person, place, and time. He appears well-developed and well-nourished. No distress.   HENT:   Mouth/Throat: Oropharynx is clear and moist.   Bilateral TMs with mild fluid and cerumen no acute infection   Eyes: Conjunctivae and EOM are normal. Pupils are equal, round, and reactive to light.   Neck: Normal range of motion.   Cardiovascular: Normal rate, regular rhythm, normal heart sounds and intact distal pulses.  Exam reveals no gallop and no friction rub.    No murmur heard.  Pulmonary/Chest: Effort normal and breath sounds normal. No respiratory distress. He has no wheezes. He has no rales. He exhibits no tenderness.   Musculoskeletal: Normal range of motion. He exhibits no edema, tenderness or deformity.   Neurological: He is alert and oriented to person, place, and time.   Patient is experiencing so much of vertigo at rest during examination that I did not test him for nystagmus   Skin: Skin is warm and dry. No rash noted. He is not diaphoretic. No erythema. No pallor.   Psychiatric: He has a normal mood and affect. His behavior is normal. Judgment and thought content normal. "       Therapeutics: Patient given Zofran 4 mg IM     Assessment/Plan:     1. Benign paroxysmal positional vertigo, unspecified laterality  ondansetron (ZOFRAN) syringe/vial injection 4 mg    meclizine (ANTIVERT) 25 MG Tab   2. Nausea  ondansetron (ZOFRAN) syringe/vial injection 4 mg    ondansetron (ZOFRAN) 8 MG Tab     Differential diagnosis, natural history, supportive care discussed. Follow-up with primary care provider within 7-10 days, emergency room precautions discussed.  Patient and/or family appears understanding of information.

## 2018-06-10 ENCOUNTER — OFFICE VISIT (OUTPATIENT)
Dept: URGENT CARE | Facility: PHYSICIAN GROUP | Age: 48
End: 2018-06-10
Payer: MEDICAID

## 2018-06-10 VITALS
SYSTOLIC BLOOD PRESSURE: 130 MMHG | WEIGHT: 165 LBS | HEIGHT: 69 IN | HEART RATE: 87 BPM | BODY MASS INDEX: 24.44 KG/M2 | DIASTOLIC BLOOD PRESSURE: 80 MMHG | OXYGEN SATURATION: 98 % | TEMPERATURE: 98.7 F | RESPIRATION RATE: 16 BRPM

## 2018-06-10 DIAGNOSIS — G89.29 ACUTE EXACERBATION OF CHRONIC LOW BACK PAIN: ICD-10-CM

## 2018-06-10 DIAGNOSIS — M54.50 ACUTE EXACERBATION OF CHRONIC LOW BACK PAIN: ICD-10-CM

## 2018-06-10 LAB
APPEARANCE UR: CLEAR
BILIRUB UR STRIP-MCNC: NORMAL MG/DL
COLOR UR AUTO: YELLOW
GLUCOSE UR STRIP.AUTO-MCNC: NORMAL MG/DL
KETONES UR STRIP.AUTO-MCNC: NORMAL MG/DL
LEUKOCYTE ESTERASE UR QL STRIP.AUTO: NORMAL
NITRITE UR QL STRIP.AUTO: NORMAL
PH UR STRIP.AUTO: 5 [PH] (ref 5–8)
PROT UR QL STRIP: NORMAL MG/DL
RBC UR QL AUTO: NORMAL
SP GR UR STRIP.AUTO: 1
UROBILINOGEN UR STRIP-MCNC: NORMAL MG/DL

## 2018-06-10 PROCEDURE — 99214 OFFICE O/P EST MOD 30 MIN: CPT | Mod: 25 | Performed by: PHYSICIAN ASSISTANT

## 2018-06-10 PROCEDURE — 81002 URINALYSIS NONAUTO W/O SCOPE: CPT | Performed by: PHYSICIAN ASSISTANT

## 2018-06-10 RX ORDER — KETOROLAC TROMETHAMINE 30 MG/ML
60 INJECTION, SOLUTION INTRAMUSCULAR; INTRAVENOUS ONCE
Status: COMPLETED | OUTPATIENT
Start: 2018-06-10 | End: 2018-06-10

## 2018-06-10 RX ADMIN — KETOROLAC TROMETHAMINE 60 MG: 30 INJECTION, SOLUTION INTRAMUSCULAR; INTRAVENOUS at 10:55

## 2018-06-10 ASSESSMENT — PAIN SCALES - GENERAL: PAINLEVEL: 10=SEVERE PAIN

## 2018-06-10 NOTE — PROGRESS NOTES
Chief Complaint   Patient presents with   • Low Back Pain     started today       HISTORY OF PRESENT ILLNESS: Patient is a 48 y.o. male who presents today for the following:    Patient is a 47 y.o. male who presents today for a Toradol injection. Patient receives chronic pain medication from his primary care provider. Patient states he sneezed this morning, causing his chronic low back pain to acutely worsen. Patient denies any changes in his chronic symptoms including lower extremity weakness, saddle anesthesia, bowel/bladder incontinence. Patient has had Toradol injections in the past and they helped take the edge off. He has scheduled follow-up with his primary care.    Patient Active Problem List    Diagnosis Date Noted   • Hypertrophy, nasal, turbinate 06/28/2016   • NATALIE (obstructive sleep apnea) 01/30/2012   • Allergic rhinitis 01/30/2012   • Fibromyalgia 01/30/2012   • BPPV (benign paroxysmal positional vertigo) 03/05/2011   • Chronic pain 03/05/2011       Allergies:Patient has no known allergies.    Current Outpatient Prescriptions Ordered in [facility]   Medication Sig Dispense Refill   • meclizine (ANTIVERT) 25 MG Tab Take 1 Tab by mouth 3 times a day as needed. 30 Tab 0   • gabapentin (NEURONTIN) 300 MG Cap Take 300 mg by mouth 3 times a day.     • aripiprazole (ABILIFY) 10 MG Tab Take 10 mg by mouth every day.     • albuterol (PROVENTIL) 2.5mg/3ml Nebu Soln solution for nebulization 2.5 mg by Nebulization route every four hours as needed for Shortness of Breath.     • hydrocodone/acetaminophen (NORCO)  MG Tab Take 1 Tab by mouth every four hours as needed. Indications: Moderate to Moderately Severe Pain     • albuterol (VENTOLIN OR PROVENTIL) 108 (90 BASE) MCG/ACT Aero Soln inhalation aerosol Inhale 2 Puffs by mouth every four hours as needed for Shortness of Breath.     • sumatriptan (IMITREX) 100 MG tablet Take 100 mg by mouth Once PRN for Migraine.     • topiramate (TOPAMAX) 50 MG tablet Take 100 mg  by mouth every bedtime.     • zolpidem (AMBIEN) 10 MG Tab Take 10 mg by mouth at bedtime as needed for Sleep.     • Multiple Vitamins-Minerals (MULTIVITAMIN GUMMIES ADULT PO) Take 2 Tabs by mouth every day.     • Multiple Vitamins-Minerals (AIRBORNE) Chew Tab Take 2 Tabs by mouth every day.     • ondansetron (ZOFRAN) 4 MG TABS Take 1 Tab by mouth every four hours as needed for Nausea/Vomiting. 20 Tab 0   • pramipexole (MIRAPEX) 0.25 MG TABS Take 0.25 mg by mouth every bedtime.     • ondansetron (ZOFRAN) 8 MG Tab Take 1 Tab by mouth every 8 hours as needed for Nausea/Vomiting. 15 Tab 0   • doxycycline (VIBRAMYCIN) 100 MG Tab Take 1 Tab by mouth 2 times a day. 20 Tab 0   • maalox plus-benadryl-visc lidocaine (MAGIC MOUTHWASH) Take 5 mL by mouth every 6 hours as needed. 90 mL 0   • amoxicillin-clavulanate (AUGMENTIN) 875-125 MG Tab Take 1 Tab by mouth 2 times a day. 20 Tab 0   • cyclobenzaprine (FLEXERIL) 10 MG Tab Take 1 Tab by mouth 3 times a day as needed. 30 Tab 0   • Olopatadine HCl 0.2 % Solution 1 Drop by Ophthalmic route 2 Times a Day. 1 Bottle 1   • diclofenac EC (VOLTAREN) 75 MG Tablet Delayed Response Take 1 Tab by mouth 2 times a day as needed (pain). 30 Tab 0   • Ibuprofen-Diphenhydramine Cit (ADVIL PM PO) Take 1 Tab by mouth as needed (Pt states for sleep).     • meclizine (ANTIVERT) 25 MG Tab Take 1 Tab by mouth 3 times a day as needed. 30 Tab 0   • Lidocaine HCl 0.5 % GEL Apply 1 Application to affected area(s) 3 times a day. Pt uses it for mouth sores.     • tramadol (ULTRAM) 50 MG TABS Take  mg by mouth every four hours as needed for Moderate Pain.       Current Facility-Administered Medications Ordered in [facility]   Medication Dose Route Frequency Provider Last Rate Last Dose   • ketorolac (TORADOL) injection 60 mg  60 mg Intramuscular Once CALLUM DuncanASIRISHA           Past Medical History:   Diagnosis Date   • Arthritis     back, arm   • ASTHMA    • Backpain 1991   • Bowel habit changes      "diarrhea   • Breath shortness     in the past   • Dental disorder     rotting away   • Elbow injury    • Head injury    • Heart burn    • Hemorrhagic disorder (HCC)     gums   • Indigestion    • Migraine with aura    • NATALIE (obstructive sleep apnea) 2012    bipap   • Pneumonia    • Restless leg syndrome    • Snoring    • Vertigo        Social History   Substance Use Topics   • Smoking status: Never Smoker   • Smokeless tobacco: Never Used   • Alcohol use No      Comment: occ       Family Status   Relation Status   • Mother Alive   • Father Alive   • Paternal Grandmother      Family History   Problem Relation Age of Onset   • Cancer Paternal Grandmother      breast       Review of Systems:    Constitutional ROS: No unexpected change in weight, No weakness, No fatigue  Eye ROS: No recent significant change in vision, No eye pain, redness, discharge  Ear ROS: No drainage, No tinnitus or vertigo, No recent change in hearing  Mouth/Throat ROS: No teeth or gum problems, No bleeding gums, No tongue complaints  Neck ROS: No swollen glands, No significant pain in neck  Pulmonary ROS: No chronic cough, sputum, or hemoptysis, No dyspnea on exertion, No wheezing  Cardiovascular ROS: No diaphoresis, No edema, No palpitations  Gastrointestinal ROS: No change in bowel habits, No significant change in appetite, No nausea, vomiting, diarrhea, or constipation  Hematologic/Lymphatic ROS: No chills, No night sweats, No weight loss  Skin/Integumentary ROS: No edema, No evidence of rash, No itching      Exam:  Blood pressure 130/80, pulse 87, temperature 37.1 °C (98.7 °F), resp. rate 16, height 1.753 m (5' 9\"), weight 74.8 kg (165 lb), SpO2 98 %.  General: Well developed, well nourished. No distress.  Eye: PERRL/EOMI; conjunctivae clear, lids normal.  ENMT: Head is grossly normal.  Pulmonary: Unlabored respiratory effort.    Extremities: No lower extremity weakness noted.  Neurologic: Grossly nonfocal. No facial " asymmetry noted.  Skin: Warm, dry, good turgor. No rashes in visible areas.   Psych: Normal mood. Alert and oriented x3. Judgment and insight is normal.    Toradol 60 mg IM    Assessment/Plan:  Continue current pain medication as directed. Follow-up with primary care as scheduled.  1. Acute exacerbation of chronic low back pain  POCT Urinalysis    ketorolac (TORADOL) injection 60 mg

## 2018-08-15 ENCOUNTER — OFFICE VISIT (OUTPATIENT)
Dept: URGENT CARE | Facility: PHYSICIAN GROUP | Age: 48
End: 2018-08-15
Payer: MEDICAID

## 2018-08-15 VITALS
WEIGHT: 170 LBS | RESPIRATION RATE: 16 BRPM | DIASTOLIC BLOOD PRESSURE: 80 MMHG | TEMPERATURE: 97.9 F | BODY MASS INDEX: 25.1 KG/M2 | OXYGEN SATURATION: 98 % | SYSTOLIC BLOOD PRESSURE: 150 MMHG | HEART RATE: 100 BPM

## 2018-08-15 DIAGNOSIS — M54.50 ACUTE EXACERBATION OF CHRONIC LOW BACK PAIN: ICD-10-CM

## 2018-08-15 DIAGNOSIS — G89.29 ACUTE EXACERBATION OF CHRONIC LOW BACK PAIN: ICD-10-CM

## 2018-08-15 PROCEDURE — 99214 OFFICE O/P EST MOD 30 MIN: CPT | Performed by: PHYSICIAN ASSISTANT

## 2018-08-15 RX ORDER — KETOROLAC TROMETHAMINE 30 MG/ML
60 INJECTION, SOLUTION INTRAMUSCULAR; INTRAVENOUS ONCE
Status: COMPLETED | OUTPATIENT
Start: 2018-08-15 | End: 2018-08-15

## 2018-08-15 RX ADMIN — KETOROLAC TROMETHAMINE 60 MG: 30 INJECTION, SOLUTION INTRAMUSCULAR; INTRAVENOUS at 11:25

## 2018-08-15 NOTE — PROGRESS NOTES
Chief Complaint   Patient presents with   • Back Pain     lower back pain/ non-injury related/ Pt stated that he needs a Toradol shot       HISTORY OF PRESENT ILLNESS: Patient is a 48 y.o. male who presents today for the following:    Patient comes in for a Toradol injection.  He has chronic low back pain but has occasional acute worsening that is not controlled by his chronic pain medication.  He denies any significant changes in his chronic pain other than his typical flare.  He is followed by primary care for his pain medication.    Patient Active Problem List    Diagnosis Date Noted   • Hypertrophy, nasal, turbinate 06/28/2016   • NATALIE (obstructive sleep apnea) 01/30/2012   • Allergic rhinitis 01/30/2012   • Fibromyalgia 01/30/2012   • BPPV (benign paroxysmal positional vertigo) 03/05/2011   • Chronic pain 03/05/2011       Allergies:Patient has no known allergies.    Current Outpatient Prescriptions Ordered in [facility]   Medication Sig Dispense Refill   • ondansetron (ZOFRAN) 8 MG Tab Take 1 Tab by mouth every 8 hours as needed for Nausea/Vomiting. 15 Tab 0   • meclizine (ANTIVERT) 25 MG Tab Take 1 Tab by mouth 3 times a day as needed. 30 Tab 0   • gabapentin (NEURONTIN) 300 MG Cap Take 300 mg by mouth 3 times a day.     • doxycycline (VIBRAMYCIN) 100 MG Tab Take 1 Tab by mouth 2 times a day. 20 Tab 0   • maalox plus-benadryl-visc lidocaine (MAGIC MOUTHWASH) Take 5 mL by mouth every 6 hours as needed. 90 mL 0   • amoxicillin-clavulanate (AUGMENTIN) 875-125 MG Tab Take 1 Tab by mouth 2 times a day. 20 Tab 0   • cyclobenzaprine (FLEXERIL) 10 MG Tab Take 1 Tab by mouth 3 times a day as needed. 30 Tab 0   • Olopatadine HCl 0.2 % Solution 1 Drop by Ophthalmic route 2 Times a Day. 1 Bottle 1   • diclofenac EC (VOLTAREN) 75 MG Tablet Delayed Response Take 1 Tab by mouth 2 times a day as needed (pain). 30 Tab 0   • aripiprazole (ABILIFY) 10 MG Tab Take 10 mg by mouth every day.     • albuterol (PROVENTIL) 2.5mg/3ml Nebu Soln  solution for nebulization 2.5 mg by Nebulization route every four hours as needed for Shortness of Breath.     • hydrocodone/acetaminophen (NORCO)  MG Tab Take 1 Tab by mouth every four hours as needed. Indications: Moderate to Moderately Severe Pain     • albuterol (VENTOLIN OR PROVENTIL) 108 (90 BASE) MCG/ACT Aero Soln inhalation aerosol Inhale 2 Puffs by mouth every four hours as needed for Shortness of Breath.     • sumatriptan (IMITREX) 100 MG tablet Take 100 mg by mouth Once PRN for Migraine.     • topiramate (TOPAMAX) 50 MG tablet Take 100 mg by mouth every bedtime.     • zolpidem (AMBIEN) 10 MG Tab Take 10 mg by mouth at bedtime as needed for Sleep.     • Multiple Vitamins-Minerals (MULTIVITAMIN GUMMIES ADULT PO) Take 2 Tabs by mouth every day.     • Multiple Vitamins-Minerals (AIRBORNE) Chew Tab Take 2 Tabs by mouth every day.     • Ibuprofen-Diphenhydramine Cit (ADVIL PM PO) Take 1 Tab by mouth as needed (Pt states for sleep).     • meclizine (ANTIVERT) 25 MG Tab Take 1 Tab by mouth 3 times a day as needed. 30 Tab 0   • ondansetron (ZOFRAN) 4 MG TABS Take 1 Tab by mouth every four hours as needed for Nausea/Vomiting. 20 Tab 0   • Lidocaine HCl 0.5 % GEL Apply 1 Application to affected area(s) 3 times a day. Pt uses it for mouth sores.     • tramadol (ULTRAM) 50 MG TABS Take  mg by mouth every four hours as needed for Moderate Pain.     • pramipexole (MIRAPEX) 0.25 MG TABS Take 0.25 mg by mouth every bedtime.       Current Facility-Administered Medications Ordered in [facility]   Medication Dose Route Frequency Provider Last Rate Last Dose   • ketorolac (TORADOL) injection 60 mg  60 mg Intramuscular Once Desi Chin P.A.-C.           Past Medical History:   Diagnosis Date   • Arthritis     back, arm   • ASTHMA    • Backpain 1991   • Bowel habit changes     diarrhea   • Breath shortness     in the past   • Dental disorder     rotting away   • Elbow injury    • Head injury 2004   • Heart burn    •  Hemorrhagic disorder (HCC)     gums   • Indigestion    • Migraine with aura    • NATALIE (obstructive sleep apnea) 2012    bipap   • Pneumonia 2004   • Restless leg syndrome    • Snoring    • Vertigo        Social History   Substance Use Topics   • Smoking status: Never Smoker   • Smokeless tobacco: Never Used   • Alcohol use No      Comment: occ       Family Status   Relation Status   • Mo Alive   • Fa Alive   • PGMo      Family History   Problem Relation Age of Onset   • Cancer Paternal Grandmother         breast       Review of Systems:    Constitutional ROS: No unexpected change in weight, No weakness, No fatigue  Eye ROS: No recent significant change in vision, No eye pain, redness, discharge  Ear ROS: No drainage, No tinnitus or vertigo, No recent change in hearing  Mouth/Throat ROS: No teeth or gum problems, No bleeding gums, No tongue complaints  Neck ROS: No swollen glands, No significant pain in neck  Pulmonary ROS: No chronic cough, sputum, or hemoptysis, No dyspnea on exertion, No wheezing  Cardiovascular ROS: No diaphoresis, No edema, No palpitations  Gastrointestinal ROS: No change in bowel habits, No significant change in appetite, No nausea, vomiting, diarrhea, or constipation  Hematologic/Lymphatic ROS: No chills, No night sweats, No weight loss  Skin/Integumentary ROS: No edema, No evidence of rash, No itching      Exam:  Blood pressure 150/80, pulse 100, temperature 36.6 °C (97.9 °F), resp. rate 16, weight 77.1 kg (170 lb), SpO2 98 %.  General: Well developed, well nourished. No distress.  HEENT: Head is grossly normal.  Pulmonary: Unlabored respiratory effort.   Back: Decreased range of motion in all planes due to pain.  Neurologic: Grossly nonfocal. No facial asymmetry noted.  Skin: Warm, dry, good turgor. No rashes in visible areas.   Psych: Normal mood. Alert and oriented x3. Judgment and insight is normal.    Toradol 60 mg IM    Assessment/Plan:  Follow up with primary care for  further evaluation and management of chronic pain.  1. Acute exacerbation of chronic low back pain  ketorolac (TORADOL) injection 60 mg

## 2018-08-25 ENCOUNTER — OFFICE VISIT (OUTPATIENT)
Dept: URGENT CARE | Facility: PHYSICIAN GROUP | Age: 48
End: 2018-08-25
Payer: MEDICAID

## 2018-08-25 VITALS
HEART RATE: 88 BPM | OXYGEN SATURATION: 97 % | HEIGHT: 69 IN | DIASTOLIC BLOOD PRESSURE: 80 MMHG | BODY MASS INDEX: 26.07 KG/M2 | WEIGHT: 176 LBS | TEMPERATURE: 98.2 F | RESPIRATION RATE: 14 BRPM | SYSTOLIC BLOOD PRESSURE: 130 MMHG

## 2018-08-25 DIAGNOSIS — G89.29 ACUTE EXACERBATION OF CHRONIC LOW BACK PAIN: ICD-10-CM

## 2018-08-25 DIAGNOSIS — M54.50 ACUTE EXACERBATION OF CHRONIC LOW BACK PAIN: ICD-10-CM

## 2018-08-25 PROCEDURE — 99214 OFFICE O/P EST MOD 30 MIN: CPT | Mod: 25 | Performed by: PHYSICIAN ASSISTANT

## 2018-08-25 PROCEDURE — 99999 PR NO CHARGE: CPT | Performed by: PHYSICIAN ASSISTANT

## 2018-08-25 RX ORDER — KETOROLAC TROMETHAMINE 30 MG/ML
60 INJECTION, SOLUTION INTRAMUSCULAR; INTRAVENOUS ONCE
Status: COMPLETED | OUTPATIENT
Start: 2018-08-25 | End: 2018-08-25

## 2018-08-25 RX ADMIN — KETOROLAC TROMETHAMINE 60 MG: 30 INJECTION, SOLUTION INTRAMUSCULAR; INTRAVENOUS at 15:29

## 2018-08-25 NOTE — PROGRESS NOTES
Chief Complaint   Patient presents with   • Back Pain     Pt requesting toradol       HISTORY OF PRESENT ILLNESS: Patient is a 48 y.o. male who presents today for the following:    Patient comes in for a Toradol injection.  He is on chronic pain medicine for chronic back pain.  He frequently comes into the urgent care for a Toradol injection as it seems to help acute exacerbations. He was recently in here for 1 and was feeling a lot better until he sneezed earlier today.  Patient denies any changes in his symptoms and states this is consistent with his flares.  He sees his primary care provider every 3 months for his pain medication refill.    Patient Active Problem List    Diagnosis Date Noted   • Hypertrophy, nasal, turbinate 06/28/2016   • NATALIE (obstructive sleep apnea) 01/30/2012   • Allergic rhinitis 01/30/2012   • Fibromyalgia 01/30/2012   • BPPV (benign paroxysmal positional vertigo) 03/05/2011   • Chronic pain 03/05/2011       Allergies:Patient has no known allergies.    Current Outpatient Prescriptions Ordered in [email or web address]   Medication Sig Dispense Refill   • meclizine (ANTIVERT) 25 MG Tab Take 1 Tab by mouth 3 times a day as needed. 30 Tab 0   • gabapentin (NEURONTIN) 300 MG Cap Take 300 mg by mouth 3 times a day.     • aripiprazole (ABILIFY) 10 MG Tab Take 10 mg by mouth every day.     • albuterol (PROVENTIL) 2.5mg/3ml Nebu Soln solution for nebulization 2.5 mg by Nebulization route every four hours as needed for Shortness of Breath.     • hydrocodone/acetaminophen (NORCO)  MG Tab Take 1 Tab by mouth every four hours as needed. Indications: Moderate to Moderately Severe Pain     • albuterol (VENTOLIN OR PROVENTIL) 108 (90 BASE) MCG/ACT Aero Soln inhalation aerosol Inhale 2 Puffs by mouth every four hours as needed for Shortness of Breath.     • sumatriptan (IMITREX) 100 MG tablet Take 100 mg by mouth Once PRN for Migraine.     • topiramate (TOPAMAX) 50 MG tablet Take 100 mg by mouth every bedtime.     •  zolpidem (AMBIEN) 10 MG Tab Take 10 mg by mouth at bedtime as needed for Sleep.     • Multiple Vitamins-Minerals (MULTIVITAMIN GUMMIES ADULT PO) Take 2 Tabs by mouth every day.     • Multiple Vitamins-Minerals (AIRBORNE) Chew Tab Take 2 Tabs by mouth every day.     • ondansetron (ZOFRAN) 4 MG TABS Take 1 Tab by mouth every four hours as needed for Nausea/Vomiting. 20 Tab 0   • pramipexole (MIRAPEX) 0.25 MG TABS Take 0.25 mg by mouth every bedtime.       Current Facility-Administered Medications Ordered in [facility]   Medication Dose Route Frequency Provider Last Rate Last Dose   • ketorolac (TORADOL) injection 60 mg  60 mg Intramuscular Once CALLUM DuncanA.-QUYEN.           Past Medical History:   Diagnosis Date   • Arthritis     back, arm   • ASTHMA    • Backpain    • Bowel habit changes     diarrhea   • Breath shortness     in the past   • Dental disorder     rotting away   • Elbow injury    • Head injury    • Heart burn    • Hemorrhagic disorder (HCC)     gums   • Indigestion    • Migraine with aura    • NATALIE (obstructive sleep apnea) 2012    bipap   • Pneumonia    • Restless leg syndrome    • Snoring    • Vertigo        Social History   Substance Use Topics   • Smoking status: Never Smoker   • Smokeless tobacco: Never Used   • Alcohol use No      Comment: occ       Family Status   Relation Status   • Mo Alive   • Fa Alive   • PGMo      Family History   Problem Relation Age of Onset   • Cancer Paternal Grandmother         breast       Review of Systems:    Constitutional ROS: No unexpected change in weight, No weakness, No fatigue  Eye ROS: No recent significant change in vision, No eye pain, redness, discharge  Ear ROS: No drainage, No tinnitus or vertigo, No recent change in hearing  Mouth/Throat ROS: No teeth or gum problems, No bleeding gums, No tongue complaints  Neck ROS: No swollen glands, No significant pain in neck  Pulmonary ROS: No chronic cough, sputum, or hemoptysis, No dyspnea  "on exertion, No wheezing  Cardiovascular ROS: No diaphoresis, No edema, No palpitations  Gastrointestinal ROS: No change in bowel habits, No significant change in appetite, No nausea, vomiting, diarrhea, or constipation  Musculoskeletal/Extremities ROS: No peripheral edema, No pain, redness or swelling on the joints  Hematologic/Lymphatic ROS: No chills, No night sweats, No weight loss  Skin/Integumentary ROS: No edema, No evidence of rash, No itching      Exam:  Blood pressure 130/80, pulse 88, temperature 36.8 °C (98.2 °F), resp. rate 14, height 1.753 m (5' 9\"), weight 79.8 kg (176 lb), SpO2 97 %.  General: Well developed, well nourished. No distress.  HEENT: Head is grossly normal.  Pulmonary: Unlabored respiratory effort.   Back: Decreased range of motion in all planes due to pain.  Neurologic: Grossly nonfocal. No facial asymmetry noted.  Extremities: No motor or sensory deficit noted.  Skin: Warm, dry, good turgor. No rashes in visible areas.   Psych: Normal mood. Alert and oriented x3. Judgment and insight is normal.    Toradol 60mg IM    Assessment/Plan:  Follow up with primary care for continued management of chronic pain.  1. Acute exacerbation of chronic low back pain  ketorolac (TORADOL) injection 60 mg       "

## 2018-10-14 ENCOUNTER — OFFICE VISIT (OUTPATIENT)
Dept: URGENT CARE | Facility: PHYSICIAN GROUP | Age: 48
End: 2018-10-14
Payer: MEDICAID

## 2018-10-14 VITALS
SYSTOLIC BLOOD PRESSURE: 136 MMHG | WEIGHT: 181 LBS | DIASTOLIC BLOOD PRESSURE: 90 MMHG | HEART RATE: 96 BPM | RESPIRATION RATE: 16 BRPM | TEMPERATURE: 98 F | OXYGEN SATURATION: 97 % | BODY MASS INDEX: 26.73 KG/M2

## 2018-10-14 DIAGNOSIS — G89.29 ACUTE EXACERBATION OF CHRONIC LOW BACK PAIN: ICD-10-CM

## 2018-10-14 DIAGNOSIS — M54.50 ACUTE EXACERBATION OF CHRONIC LOW BACK PAIN: ICD-10-CM

## 2018-10-14 PROCEDURE — 99214 OFFICE O/P EST MOD 30 MIN: CPT | Performed by: PHYSICIAN ASSISTANT

## 2018-10-14 RX ORDER — KETOROLAC TROMETHAMINE 30 MG/ML
60 INJECTION, SOLUTION INTRAMUSCULAR; INTRAVENOUS ONCE
Status: COMPLETED | OUTPATIENT
Start: 2018-10-14 | End: 2018-10-14

## 2018-10-14 RX ADMIN — KETOROLAC TROMETHAMINE 60 MG: 30 INJECTION, SOLUTION INTRAMUSCULAR; INTRAVENOUS at 14:29

## 2018-10-14 NOTE — PROGRESS NOTES
Chief Complaint   Patient presents with   • Back Pain     lower back pain/ Pt needs Toradol shot       HISTORY OF PRESENT ILLNESS: Patient is a 48 y.o. male who presents today for the following:    Patient is here for a Toradol injection.  He is on chronic pain medication for chronic back pain.  He frequently comes into the urgent care for a Toradol injection as it seems to help his acute exacerbations.  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  This has exacerbated his chronic issues.  He denies any significant changes in his symptoms and states this is consistent with his flares.  He sees a primary care provider every 3 months for his pain medication refill.    Patient Active Problem List    Diagnosis Date Noted   • Hypertrophy, nasal, turbinate 06/28/2016   • NATALIE (obstructive sleep apnea) 01/30/2012   • Allergic rhinitis 01/30/2012   • Fibromyalgia 01/30/2012   • BPPV (benign paroxysmal positional vertigo) 03/05/2011   • Chronic pain 03/05/2011       Allergies:Patient has no known allergies.    Current Outpatient Prescriptions Ordered in [facility]   Medication Sig Dispense Refill   • meclizine (ANTIVERT) 25 MG Tab Take 1 Tab by mouth 3 times a day as needed. 30 Tab 0   • sumatriptan (IMITREX) 100 MG tablet Take 100 mg by mouth Once PRN for Migraine.     • topiramate (TOPAMAX) 50 MG tablet Take 100 mg by mouth every bedtime.     • zolpidem (AMBIEN) 10 MG Tab Take 10 mg by mouth at bedtime as needed for Sleep.     • pramipexole (MIRAPEX) 0.25 MG TABS Take 0.25 mg by mouth every bedtime.     • gabapentin (NEURONTIN) 300 MG Cap Take 300 mg by mouth 3 times a day.     • aripiprazole (ABILIFY) 10 MG Tab Take 10 mg by mouth every day.     • albuterol (PROVENTIL) 2.5mg/3ml Nebu Soln solution for nebulization 2.5 mg by Nebulization route every four hours as needed for Shortness of Breath.     • hydrocodone/acetaminophen (NORCO)  MG Tab Take 1 Tab by mouth  every four hours as needed. Indications: Moderate to Moderately Severe Pain     • albuterol (VENTOLIN OR PROVENTIL) 108 (90 BASE) MCG/ACT Aero Soln inhalation aerosol Inhale 2 Puffs by mouth every four hours as needed for Shortness of Breath.     • Multiple Vitamins-Minerals (MULTIVITAMIN GUMMIES ADULT PO) Take 2 Tabs by mouth every day.     • Multiple Vitamins-Minerals (AIRBORNE) Chew Tab Take 2 Tabs by mouth every day.     • ondansetron (ZOFRAN) 4 MG TABS Take 1 Tab by mouth every four hours as needed for Nausea/Vomiting. 20 Tab 0     Current Facility-Administered Medications Ordered in [facility]   Medication Dose Route Frequency Provider Last Rate Last Dose   • ketorolac (TORADOL) injection 60 mg  60 mg Intramuscular Once CARMEN Duncan.A.-QUYEN.           Past Medical History:   Diagnosis Date   • Arthritis     back, arm   • ASTHMA    • Backpain    • Bowel habit changes     diarrhea   • Breath shortness     in the past   • Dental disorder     rotting away   • Elbow injury    • Head injury    • Heart burn    • Hemorrhagic disorder (HCC)     gums   • Indigestion    • Migraine with aura    • NATALIE (obstructive sleep apnea) 2012    bipap   • Pneumonia    • Restless leg syndrome    • Snoring    • Vertigo        Social History   Substance Use Topics   • Smoking status: Never Smoker   • Smokeless tobacco: Never Used   • Alcohol use No      Comment: occ       Family Status   Relation Status   • Mo Alive   • Fa Alive   • PGMo      Family History   Problem Relation Age of Onset   • Cancer Paternal Grandmother         breast       Review of Systems:    Constitutional ROS: No unexpected change in weight, No weakness, No fatigue  Eye ROS: No recent significant change in vision, No eye pain, redness, discharge  Ear ROS: No drainage, No tinnitus or vertigo, No recent change in hearing  Mouth/Throat ROS: No teeth or gum problems, No bleeding gums, No tongue complaints  Neck ROS: No swollen glands, No  significant pain in neck  Pulmonary ROS: No chronic cough, sputum, or hemoptysis, No dyspnea on exertion, No wheezing  Cardiovascular ROS: No diaphoresis, No edema, No palpitations  Gastrointestinal ROS: No change in bowel habits, No significant change in appetite, No nausea, vomiting, diarrhea, or constipation  Hematologic/Lymphatic ROS: No chills, No night sweats, No weight loss  Skin/Integumentary ROS: No edema, No evidence of rash, No itching      Exam:  Blood pressure 136/90, pulse 96, temperature 36.7 °C (98 °F), temperature source Temporal, resp. rate 16, weight 82.1 kg (181 lb), SpO2 97 %.  General: Well developed, well nourished. No distress.  HEENT: Head is grossly normal.  Pulmonary: Unlabored respiratory effort.   Back: Decreased range of motion in all planes due to pain.  Neurologic: Grossly nonfocal. No facial asymmetry noted.  Extremities: No motor or sensory deficit noted.  Skin: Warm, dry, good turgor. No rashes in visible areas.   Psych: Normal mood. Alert and oriented x3. Judgment and insight is normal.     Toradol 60 mg IM    Assessment/Plan:  Follow-up with primary care for further evaluation and management of chronic pain.  1. Acute exacerbation of chronic low back pain  ketorolac (TORADOL) injection 60 mg

## 2019-02-11 ENCOUNTER — HOSPITAL ENCOUNTER (OUTPATIENT)
Dept: LAB | Facility: MEDICAL CENTER | Age: 49
End: 2019-02-11
Attending: PHYSICIAN ASSISTANT
Payer: MEDICAID

## 2019-02-11 LAB
ANION GAP SERPL CALC-SCNC: 5 MMOL/L (ref 0–11.9)
BUN SERPL-MCNC: 22 MG/DL (ref 8–22)
CALCIUM SERPL-MCNC: 8.7 MG/DL (ref 8.5–10.5)
CHLORIDE SERPL-SCNC: 111 MMOL/L (ref 96–112)
CO2 SERPL-SCNC: 24 MMOL/L (ref 20–33)
CREAT SERPL-MCNC: 1.32 MG/DL (ref 0.5–1.4)
GLUCOSE SERPL-MCNC: 91 MG/DL (ref 65–99)
POTASSIUM SERPL-SCNC: 4.3 MMOL/L (ref 3.6–5.5)
PSA SERPL-MCNC: 0.93 NG/ML (ref 0–4)
SODIUM SERPL-SCNC: 140 MMOL/L (ref 135–145)

## 2019-02-11 PROCEDURE — 84153 ASSAY OF PSA TOTAL: CPT

## 2019-02-11 PROCEDURE — 80048 BASIC METABOLIC PNL TOTAL CA: CPT

## 2019-02-11 PROCEDURE — 36415 COLL VENOUS BLD VENIPUNCTURE: CPT

## 2019-10-09 ENCOUNTER — HOSPITAL ENCOUNTER (OUTPATIENT)
Dept: LAB | Facility: MEDICAL CENTER | Age: 49
End: 2019-10-09
Attending: PSYCHIATRY & NEUROLOGY
Payer: MEDICAID

## 2019-10-09 LAB
ALBUMIN SERPL BCP-MCNC: 5.1 G/DL (ref 3.2–4.9)
ALBUMIN/GLOB SERPL: 2.1 G/DL
ALP SERPL-CCNC: 86 U/L (ref 30–99)
ALT SERPL-CCNC: 13 U/L (ref 2–50)
ANION GAP SERPL CALC-SCNC: 6 MMOL/L (ref 0–11.9)
APPEARANCE UR: CLEAR
AST SERPL-CCNC: 16 U/L (ref 12–45)
BASOPHILS # BLD AUTO: 0.3 % (ref 0–1.8)
BASOPHILS # BLD: 0.02 K/UL (ref 0–0.12)
BILIRUB SERPL-MCNC: 1 MG/DL (ref 0.1–1.5)
BILIRUB UR QL STRIP.AUTO: NEGATIVE
BUN SERPL-MCNC: 17 MG/DL (ref 8–22)
CALCIUM SERPL-MCNC: 9.1 MG/DL (ref 8.5–10.5)
CHLORIDE SERPL-SCNC: 109 MMOL/L (ref 96–112)
CHOLEST SERPL-MCNC: 170 MG/DL (ref 100–199)
CO2 SERPL-SCNC: 24 MMOL/L (ref 20–33)
COLOR UR: YELLOW
CREAT SERPL-MCNC: 1.2 MG/DL (ref 0.5–1.4)
EOSINOPHIL # BLD AUTO: 0.17 K/UL (ref 0–0.51)
EOSINOPHIL NFR BLD: 2.5 % (ref 0–6.9)
ERYTHROCYTE [DISTWIDTH] IN BLOOD BY AUTOMATED COUNT: 44.7 FL (ref 35.9–50)
GLOBULIN SER CALC-MCNC: 2.4 G/DL (ref 1.9–3.5)
GLUCOSE SERPL-MCNC: 86 MG/DL (ref 65–99)
GLUCOSE UR STRIP.AUTO-MCNC: NEGATIVE MG/DL
HCT VFR BLD AUTO: 46.2 % (ref 42–52)
HDLC SERPL-MCNC: 45 MG/DL
HGB BLD-MCNC: 14.9 G/DL (ref 14–18)
IMM GRANULOCYTES # BLD AUTO: 0.02 K/UL (ref 0–0.11)
IMM GRANULOCYTES NFR BLD AUTO: 0.3 % (ref 0–0.9)
KETONES UR STRIP.AUTO-MCNC: NEGATIVE MG/DL
LDLC SERPL CALC-MCNC: 103 MG/DL
LEUKOCYTE ESTERASE UR QL STRIP.AUTO: NEGATIVE
LYMPHOCYTES # BLD AUTO: 1.28 K/UL (ref 1–4.8)
LYMPHOCYTES NFR BLD: 18.9 % (ref 22–41)
MCH RBC QN AUTO: 30 PG (ref 27–33)
MCHC RBC AUTO-ENTMCNC: 32.3 G/DL (ref 33.7–35.3)
MCV RBC AUTO: 93 FL (ref 81.4–97.8)
MICRO URNS: NORMAL
MONOCYTES # BLD AUTO: 0.51 K/UL (ref 0–0.85)
MONOCYTES NFR BLD AUTO: 7.5 % (ref 0–13.4)
NEUTROPHILS # BLD AUTO: 4.76 K/UL (ref 1.82–7.42)
NEUTROPHILS NFR BLD: 70.5 % (ref 44–72)
NITRITE UR QL STRIP.AUTO: NEGATIVE
NRBC # BLD AUTO: 0 K/UL
NRBC BLD-RTO: 0 /100 WBC
PH UR STRIP.AUTO: 7 [PH] (ref 5–8)
PLATELET # BLD AUTO: 256 K/UL (ref 164–446)
PMV BLD AUTO: 10.2 FL (ref 9–12.9)
POTASSIUM SERPL-SCNC: 4.2 MMOL/L (ref 3.6–5.5)
PROT SERPL-MCNC: 7.5 G/DL (ref 6–8.2)
PROT UR QL STRIP: NEGATIVE MG/DL
RBC # BLD AUTO: 4.97 M/UL (ref 4.7–6.1)
RBC UR QL AUTO: NEGATIVE
SODIUM SERPL-SCNC: 139 MMOL/L (ref 135–145)
SP GR UR STRIP.AUTO: 1.01
T4 FREE SERPL-MCNC: 0.66 NG/DL (ref 0.53–1.43)
TRIGL SERPL-MCNC: 109 MG/DL (ref 0–149)
TSH SERPL DL<=0.005 MIU/L-ACNC: 3.22 UIU/ML (ref 0.38–5.33)
UROBILINOGEN UR STRIP.AUTO-MCNC: 0.2 MG/DL
WBC # BLD AUTO: 6.8 K/UL (ref 4.8–10.8)

## 2019-10-09 PROCEDURE — 81003 URINALYSIS AUTO W/O SCOPE: CPT

## 2019-10-09 PROCEDURE — 85025 COMPLETE CBC W/AUTO DIFF WBC: CPT

## 2019-10-09 PROCEDURE — 80061 LIPID PANEL: CPT

## 2019-10-09 PROCEDURE — 84439 ASSAY OF FREE THYROXINE: CPT

## 2019-10-09 PROCEDURE — 80053 COMPREHEN METABOLIC PANEL: CPT

## 2019-10-09 PROCEDURE — 80307 DRUG TEST PRSMV CHEM ANLYZR: CPT

## 2019-10-09 PROCEDURE — 84443 ASSAY THYROID STIM HORMONE: CPT

## 2019-10-09 PROCEDURE — 36415 COLL VENOUS BLD VENIPUNCTURE: CPT

## 2019-10-11 ENCOUNTER — HOSPITAL ENCOUNTER (OUTPATIENT)
Dept: CARDIOLOGY | Facility: MEDICAL CENTER | Age: 49
End: 2019-10-11
Attending: PSYCHIATRY & NEUROLOGY
Payer: MEDICAID

## 2019-10-11 LAB
AMPHETAMINES UR QL: NEGATIVE NG/ML
BARBITURATES UR QL: NEGATIVE NG/ML
BENZODIAZ UR QL: NEGATIVE NG/ML
CANNABINOIDS UR QL SCN: NEGATIVE NG/ML
COCAINE UR QL: NEGATIVE NG/ML
DRUG SCREEN COMMENT UR-IMP: NORMAL
EKG IMPRESSION: NORMAL
MDMA CTO UR SCN-MCNC: NEGATIVE NG/ML
METHADONE UR QL: NEGATIVE NG/ML
OPIATES UR QL: POSITIVE NG/ML
OXYCODONE CTO UR SCN-MCNC: NEGATIVE NG/ML
PCP UR QL SCN: NEGATIVE NG/ML
PROPOXYPH UR QL: NEGATIVE NG/ML

## 2019-10-11 PROCEDURE — 93005 ELECTROCARDIOGRAM TRACING: CPT | Performed by: PSYCHIATRY & NEUROLOGY

## 2019-10-11 PROCEDURE — 93010 ELECTROCARDIOGRAM REPORT: CPT | Performed by: INTERNAL MEDICINE

## 2020-02-11 ENCOUNTER — HOSPITAL ENCOUNTER (OUTPATIENT)
Dept: LAB | Facility: MEDICAL CENTER | Age: 50
End: 2020-02-11
Attending: INTERNAL MEDICINE
Payer: MEDICAID

## 2020-02-11 PROCEDURE — 82941 ASSAY OF GASTRIN: CPT

## 2020-02-11 PROCEDURE — 36415 COLL VENOUS BLD VENIPUNCTURE: CPT

## 2020-02-13 LAB — GASTRIN SERPL-MCNC: 22 PG/ML (ref 0–100)

## 2020-08-17 ENCOUNTER — HOSPITAL ENCOUNTER (OUTPATIENT)
Dept: LAB | Facility: MEDICAL CENTER | Age: 50
End: 2020-08-17
Attending: UROLOGY
Payer: MEDICAID

## 2020-08-17 LAB — PSA SERPL-MCNC: 0.65 NG/ML (ref 0–4)

## 2020-08-17 PROCEDURE — 36415 COLL VENOUS BLD VENIPUNCTURE: CPT

## 2020-08-17 PROCEDURE — 84153 ASSAY OF PSA TOTAL: CPT

## 2021-02-22 ENCOUNTER — HOSPITAL ENCOUNTER (OUTPATIENT)
Dept: LAB | Facility: MEDICAL CENTER | Age: 51
End: 2021-02-22
Attending: PSYCHIATRY & NEUROLOGY
Payer: MEDICAID

## 2021-02-22 LAB
ALBUMIN SERPL BCP-MCNC: 4.6 G/DL (ref 3.2–4.9)
ALBUMIN/GLOB SERPL: 1.8 G/DL
ALP SERPL-CCNC: 97 U/L (ref 30–99)
ALT SERPL-CCNC: 20 U/L (ref 2–50)
ANION GAP SERPL CALC-SCNC: 9 MMOL/L (ref 7–16)
APPEARANCE UR: CLEAR
AST SERPL-CCNC: 21 U/L (ref 12–45)
BASOPHILS # BLD AUTO: 0.5 % (ref 0–1.8)
BASOPHILS # BLD: 0.03 K/UL (ref 0–0.12)
BILIRUB SERPL-MCNC: 0.3 MG/DL (ref 0.1–1.5)
BILIRUB UR QL STRIP.AUTO: NEGATIVE
BUN SERPL-MCNC: 20 MG/DL (ref 8–22)
CALCIUM SERPL-MCNC: 9.1 MG/DL (ref 8.5–10.5)
CHLORIDE SERPL-SCNC: 113 MMOL/L (ref 96–112)
CHOLEST SERPL-MCNC: 188 MG/DL (ref 100–199)
CO2 SERPL-SCNC: 23 MMOL/L (ref 20–33)
COLOR UR: YELLOW
CREAT SERPL-MCNC: 1.19 MG/DL (ref 0.5–1.4)
EOSINOPHIL # BLD AUTO: 0.08 K/UL (ref 0–0.51)
EOSINOPHIL NFR BLD: 1.2 % (ref 0–6.9)
ERYTHROCYTE [DISTWIDTH] IN BLOOD BY AUTOMATED COUNT: 46.1 FL (ref 35.9–50)
FASTING STATUS PATIENT QL REPORTED: NORMAL
GLOBULIN SER CALC-MCNC: 2.6 G/DL (ref 1.9–3.5)
GLUCOSE SERPL-MCNC: 108 MG/DL (ref 65–99)
GLUCOSE UR STRIP.AUTO-MCNC: NEGATIVE MG/DL
HCT VFR BLD AUTO: 43 % (ref 42–52)
HDLC SERPL-MCNC: 44 MG/DL
HGB BLD-MCNC: 13.7 G/DL (ref 14–18)
IMM GRANULOCYTES # BLD AUTO: 0.04 K/UL (ref 0–0.11)
IMM GRANULOCYTES NFR BLD AUTO: 0.6 % (ref 0–0.9)
KETONES UR STRIP.AUTO-MCNC: NEGATIVE MG/DL
LDLC SERPL CALC-MCNC: 117 MG/DL
LEUKOCYTE ESTERASE UR QL STRIP.AUTO: NEGATIVE
LYMPHOCYTES # BLD AUTO: 1.57 K/UL (ref 1–4.8)
LYMPHOCYTES NFR BLD: 23.7 % (ref 22–41)
MCH RBC QN AUTO: 29.9 PG (ref 27–33)
MCHC RBC AUTO-ENTMCNC: 31.9 G/DL (ref 33.7–35.3)
MCV RBC AUTO: 93.9 FL (ref 81.4–97.8)
MICRO URNS: NORMAL
MONOCYTES # BLD AUTO: 0.5 K/UL (ref 0–0.85)
MONOCYTES NFR BLD AUTO: 7.5 % (ref 0–13.4)
NEUTROPHILS # BLD AUTO: 4.41 K/UL (ref 1.82–7.42)
NEUTROPHILS NFR BLD: 66.5 % (ref 44–72)
NITRITE UR QL STRIP.AUTO: NEGATIVE
NRBC # BLD AUTO: 0 K/UL
NRBC BLD-RTO: 0 /100 WBC
PH UR STRIP.AUTO: 6 [PH] (ref 5–8)
PLATELET # BLD AUTO: 283 K/UL (ref 164–446)
PMV BLD AUTO: 10.1 FL (ref 9–12.9)
POTASSIUM SERPL-SCNC: 3.8 MMOL/L (ref 3.6–5.5)
PROT SERPL-MCNC: 7.2 G/DL (ref 6–8.2)
PROT UR QL STRIP: NEGATIVE MG/DL
RBC # BLD AUTO: 4.58 M/UL (ref 4.7–6.1)
RBC UR QL AUTO: NEGATIVE
SODIUM SERPL-SCNC: 145 MMOL/L (ref 135–145)
SP GR UR STRIP.AUTO: >=1.03
T4 FREE SERPL-MCNC: 0.86 NG/DL (ref 0.93–1.7)
TRIGL SERPL-MCNC: 133 MG/DL (ref 0–149)
TSH SERPL DL<=0.005 MIU/L-ACNC: 3.66 UIU/ML (ref 0.38–5.33)
UROBILINOGEN UR STRIP.AUTO-MCNC: 0.2 MG/DL
WBC # BLD AUTO: 6.6 K/UL (ref 4.8–10.8)

## 2021-02-22 PROCEDURE — 36415 COLL VENOUS BLD VENIPUNCTURE: CPT

## 2021-02-22 PROCEDURE — 84439 ASSAY OF FREE THYROXINE: CPT

## 2021-02-22 PROCEDURE — 84443 ASSAY THYROID STIM HORMONE: CPT

## 2021-02-22 PROCEDURE — 81003 URINALYSIS AUTO W/O SCOPE: CPT

## 2021-02-22 PROCEDURE — 80053 COMPREHEN METABOLIC PANEL: CPT

## 2021-02-22 PROCEDURE — 80061 LIPID PANEL: CPT

## 2021-02-22 PROCEDURE — 85025 COMPLETE CBC W/AUTO DIFF WBC: CPT

## 2021-07-09 DIAGNOSIS — Z00.6 RESEARCH STUDY PATIENT: ICD-10-CM

## 2021-07-10 ENCOUNTER — HOSPITAL ENCOUNTER (OUTPATIENT)
Facility: MEDICAL CENTER | Age: 51
End: 2021-07-10
Attending: PATHOLOGY
Payer: COMMERCIAL

## 2021-07-10 DIAGNOSIS — Z00.6 RESEARCH STUDY PATIENT: ICD-10-CM

## 2021-07-15 LAB
ELF SCORE: 8.7
RELATIVE RISK: NORMAL
RISK GROUP: NORMAL
RISK: 3.3 %

## 2021-08-16 ENCOUNTER — HOSPITAL ENCOUNTER (OUTPATIENT)
Dept: LAB | Facility: MEDICAL CENTER | Age: 51
End: 2021-08-16
Attending: UROLOGY
Payer: MEDICAID

## 2021-08-16 ENCOUNTER — HOSPITAL ENCOUNTER (OUTPATIENT)
Dept: LAB | Facility: MEDICAL CENTER | Age: 51
End: 2021-08-16
Attending: INTERNAL MEDICINE
Payer: MEDICAID

## 2021-08-16 LAB
ANION GAP SERPL CALC-SCNC: 10 MMOL/L (ref 7–16)
BUN SERPL-MCNC: 15 MG/DL (ref 8–22)
CALCIUM SERPL-MCNC: 9.2 MG/DL (ref 8.5–10.5)
CHLORIDE SERPL-SCNC: 109 MMOL/L (ref 96–112)
CHOLEST SERPL-MCNC: 186 MG/DL (ref 100–199)
CO2 SERPL-SCNC: 22 MMOL/L (ref 20–33)
CREAT SERPL-MCNC: 0.92 MG/DL (ref 0.5–1.4)
EST. AVERAGE GLUCOSE BLD GHB EST-MCNC: 108 MG/DL
FASTING STATUS PATIENT QL REPORTED: NORMAL
GLUCOSE SERPL-MCNC: 140 MG/DL (ref 65–99)
HBA1C MFR BLD: 5.4 % (ref 4–5.6)
HDLC SERPL-MCNC: 43 MG/DL
LDLC SERPL CALC-MCNC: 112 MG/DL
POTASSIUM SERPL-SCNC: 4 MMOL/L (ref 3.6–5.5)
SODIUM SERPL-SCNC: 141 MMOL/L (ref 135–145)
TRIGL SERPL-MCNC: 155 MG/DL (ref 0–149)

## 2021-08-16 PROCEDURE — 36415 COLL VENOUS BLD VENIPUNCTURE: CPT

## 2021-08-16 PROCEDURE — 83036 HEMOGLOBIN GLYCOSYLATED A1C: CPT

## 2021-08-16 PROCEDURE — 80061 LIPID PANEL: CPT

## 2021-08-16 PROCEDURE — 80048 BASIC METABOLIC PNL TOTAL CA: CPT

## 2021-08-20 ENCOUNTER — HOSPITAL ENCOUNTER (OUTPATIENT)
Dept: LAB | Facility: MEDICAL CENTER | Age: 51
End: 2021-08-20
Attending: UROLOGY
Payer: MEDICAID

## 2021-08-20 PROCEDURE — 84153 ASSAY OF PSA TOTAL: CPT

## 2021-08-20 PROCEDURE — 36415 COLL VENOUS BLD VENIPUNCTURE: CPT

## 2021-08-21 LAB — PSA SERPL-MCNC: 0.78 NG/ML (ref 0–4)

## 2021-09-21 ENCOUNTER — HOSPITAL ENCOUNTER (OUTPATIENT)
Facility: MEDICAL CENTER | Age: 51
End: 2021-09-21
Attending: PHYSICIAN ASSISTANT
Payer: MEDICAID

## 2021-09-21 PROCEDURE — 87086 URINE CULTURE/COLONY COUNT: CPT

## 2021-09-22 LAB
AMBIGUOUS DTTM AMBI4: NORMAL
SIGNIFICANT IND 70042: NORMAL
SITE SITE: NORMAL
SOURCE SOURCE: NORMAL

## 2021-09-24 LAB
BACTERIA UR CULT: NORMAL
SIGNIFICANT IND 70042: NORMAL
SITE SITE: NORMAL
SOURCE SOURCE: NORMAL

## 2022-01-11 ENCOUNTER — HOSPITAL ENCOUNTER (OUTPATIENT)
Dept: LAB | Facility: MEDICAL CENTER | Age: 52
End: 2022-01-11
Attending: UROLOGY
Payer: MEDICAID

## 2022-01-11 PROCEDURE — 87086 URINE CULTURE/COLONY COUNT: CPT

## 2022-01-14 LAB
BACTERIA UR CULT: NORMAL
SIGNIFICANT IND 70042: NORMAL
SITE SITE: NORMAL
SOURCE SOURCE: NORMAL

## 2022-06-02 ENCOUNTER — OFFICE VISIT (OUTPATIENT)
Dept: SLEEP MEDICINE | Facility: MEDICAL CENTER | Age: 52
End: 2022-06-02
Payer: MEDICAID

## 2022-06-02 VITALS
HEIGHT: 69 IN | OXYGEN SATURATION: 96 % | SYSTOLIC BLOOD PRESSURE: 130 MMHG | HEART RATE: 111 BPM | WEIGHT: 173 LBS | BODY MASS INDEX: 25.62 KG/M2 | DIASTOLIC BLOOD PRESSURE: 90 MMHG | RESPIRATION RATE: 16 BRPM

## 2022-06-02 DIAGNOSIS — G25.81 RESTLESS LEGS SYNDROME: ICD-10-CM

## 2022-06-02 DIAGNOSIS — G47.33 OSA ON CPAP: ICD-10-CM

## 2022-06-02 DIAGNOSIS — G89.4 CHRONIC PAIN SYNDROME: ICD-10-CM

## 2022-06-02 PROCEDURE — 99204 OFFICE O/P NEW MOD 45 MIN: CPT | Performed by: PREVENTIVE MEDICINE

## 2022-06-02 ASSESSMENT — FIBROSIS 4 INDEX: FIB4 SCORE: 0.88

## 2022-06-02 NOTE — PROGRESS NOTES
"CHIEF COMPLIANT: \"I need supplies.\"  HISTORY OF PRESENT ILLNESS:  Singh Torres is a 52 y.o. male kindly referred by Venancio Vinson M.D.. Past medical history includes BPPV, NATALIE, fibromyalgia, head injury in 2002, ENT evaluation on nares without specific treatment with exception of repair of deviated septum in 2016, and chronic pain.     Sleep History: He is using PAP therapy and he has a AirSense 10 AutoSet. He has a ResMed P30i mask. His first sleep study was done in 2020 in [facility] sleep [facility]. He does have witnessed apneas. He snores lightly and has maintenance insomnia and restless legs on medication. He goes to bed at 7pm and wakes up at 5am and he states it takes him 2 hours to fall asleep. He wakes up between 430 and 5 every morning and does not feel refreshed. He will take a nap. He currently uses cigarette vaping for his nicotine fix, but does MJ usage. He drinks alcohol once a year and caffeinated once every 3 months at [facility]. He is on SSI and . Patient takes ambien as a sleep aid.      EPWORTH Score:  17 /24, this is  consistent with EDS.  ( DUE to meds)     Sleep Study History:  [facility]   Outpatient diagnostic home sleep test   03/15/2022  AHI 4.3   Oxygen Mauro: 84%   Note patient was diagnosed with mild sleep apnea (incorrect diagnosis.) The patient then went on to have a full night titration in lab that was done at [facility].     04/05/2020  Overall treatment AHI:6.0  Oxygen mauro 85.0%  Patient was titrated on CPAP of 5 and 6cm H2O    Patient reports he continues to use CPAP at 6cm H2O and is wondering if it might be possible to get a new study done due to not getting the same benefits that he got earlier in his treatment. He is experiencing some fatigue.             Significant comorbidities and modifying factors: see HPI  PROBLEM LIST:  Patient Active Problem List    Diagnosis Date Noted   • Hypertrophy, nasal, turbinate 06/28/2016   • NATALIE " (obstructive sleep apnea) 01/30/2012   • Allergic rhinitis 01/30/2012   • Fibromyalgia 01/30/2012   • BPPV (benign paroxysmal positional vertigo) 03/05/2011   • Chronic pain 03/05/2011     PAST MEDICAL HISTORY:  Past Medical History:   Diagnosis Date   • Arthritis     back, arm   • ASTHMA    • Backpain 01/01/1991   • Bowel habit changes     diarrhea   • Breath shortness     in the past   • Dental disorder     rotting away   • Elbow injury    • Head injury 01/01/2004   • Heart burn    • Hemorrhagic disorder (HCC)     gums   • Indigestion    • Migraine with aura    • NATALIE (obstructive sleep apnea) 01/30/2012    bipap   • Pneumonia 01/01/2004   • Restless leg syndrome    • Sleep apnea    • Snoring    • Vertigo       PAST SOCIAL HISTORY:  Past Surgical History:   Procedure Laterality Date   • TURBINOPLASTY Right 6/28/2016    Procedure: TURBINOPLASTY;  Surgeon: Zaki Yu M.D.;  Location: SURGERY SAME DAY [facility];  Service:    • OTHER  2007    colonoscopy   • OTHER  2006    Nasal - deviated septum     PAST FAMILY HISTORY:  Family History   Problem Relation Age of Onset   • Cancer Paternal Grandmother         breast     SOCIAL HISTORY:  Social History     Socioeconomic History   • Marital status:      Spouse name: Not on file   • Number of children: Not on file   • Years of education: Not on file   • Highest education level: Not on file   Occupational History   • Not on file   Tobacco Use   • Smoking status: Never Smoker   • Smokeless tobacco: Never Used   Substance and Sexual Activity   • Alcohol use: No     Comment: occ   • Drug use: No   • Sexual activity: Yes     Partners: Female   Other Topics Concern   • Not on file   Social History Narrative    ** Merged History Encounter **          Social Determinants of Health     Financial Resource Strain: Not on file   Food Insecurity: Not on file   Transportation Needs: Not on file   Physical Activity: Not on file   Stress: Not on file   Social Connections:  "Not on file   Intimate Partner Violence: Not on file   Housing Stability: Not on file     ALLERGIES: Patient has no known allergies.  MEDICATIONS:  Current Outpatient Medications   Medication Sig Dispense Refill   • meclizine (ANTIVERT) 25 MG Tab Take 1 Tab by mouth 3 times a day as needed. 30 Tab 0   • gabapentin (NEURONTIN) 300 MG Cap Take 300 mg by mouth 3 times a day.     • aripiprazole (ABILIFY) 10 MG Tab Take 10 mg by mouth every day.     • albuterol (PROVENTIL) 2.5mg/3ml Nebu Soln solution for nebulization 2.5 mg by Nebulization route every four hours as needed for Shortness of Breath.     • hydrocodone/acetaminophen (NORCO)  MG Tab Take 1 Tab by mouth every four hours as needed. Indications: Moderate to Moderately Severe Pain     • albuterol (VENTOLIN OR PROVENTIL) 108 (90 BASE) MCG/ACT Aero Soln inhalation aerosol Inhale 2 Puffs by mouth every four hours as needed for Shortness of Breath.     • sumatriptan (IMITREX) 100 MG tablet Take 100 mg by mouth Once PRN for Migraine.     • topiramate (TOPAMAX) 50 MG tablet Take 100 mg by mouth every bedtime.     • zolpidem (AMBIEN) 10 MG Tab Take 10 mg by mouth at bedtime as needed for Sleep.     • Multiple Vitamins-Minerals (MULTIVITAMIN GUMMIES ADULT PO) Take 2 Tabs by mouth every day.     • ondansetron (ZOFRAN) 4 MG TABS Take 1 Tab by mouth every four hours as needed for Nausea/Vomiting. 20 Tab 0   • pramipexole (MIRAPEX) 0.25 MG Tab Take 0.25 mg by mouth every bedtime.     • Multiple Vitamins-Minerals (AIRBORNE) Chew Tab Take 2 Tabs by mouth every day.       No current facility-administered medications for this visit.    \"CURRENT RX\"    REVIEW OF SYSTEMS:  Constitutional: Denies weight loss, endorses chronic daytime fatigue  Eyes: Denies vision changes  Ears/Nose/Mouth/Throat: Denies rhinitis/nasal congestion, injury, decayed teeth/toothaches.  Cardiovascular: Denies chest pain, tightness, palpitations, swelling in legs/feet, difficulty breathing when lying " "down but gets better when sitting up.   Respiratory: Denies shortness of breath while awake,  Sleep: per HPI  Gastrointestinal: Denies  difficulty swallowing,  heartburn.  Genitourinary: REPORTS nocturia  Musculoskeletal: Endorses back pain   Neurological: Denies frequent headaches,weakness, dizziness.    PHYSICAL EXAM/VITALS:  BP (!) 130/90 (BP Location: Left arm, Patient Position: Sitting, BP Cuff Size: Adult)   Pulse (!) 111   Resp 16   Ht 1.753 m (5' 9\")   Wt 78.5 kg (173 lb)   SpO2 96%   BMI 25.55 kg/m²   Neck circumference (inches): 16  Appearance: Well-nourished, well-developed,  looks stated age, no acute distress  Eyes:   EOMI  ENMT:  Mallampati:3    Neck: Supple, trachea midline  Respiratory effort:  No intercostal retractions or use of accessory muscles  Lung auscultation:  No wheezes rhonchi rubs or rales  Cardiac: No murmurs, rubs, or gallops; regular rhythm, normal rate; no edema  Musculoskeletal:  Grossly normal; gait and station normal  Neurologic:  oriented to person, time, place, and purpose; judgement intact  Psychiatric:  No depression, anxiety, agitation    MEDICAL DECISION MAKING:  • The medical record was reviewed in its as pertains to this referral. This includes records from primary care, consultants notes,  referral request, hospital records, labs, imaging. Any available diagnostic and titration nocturnal polysomnograms, home sleep apnea tests, continuous nocturnal oximetry results, multiple sleep latency tests, and compliance reports were reviewed with the patient.    ASSESSMENT/PLAN:  USES PREFFERED  DME  1. NATALIE on CPAP  - Polysomnography, 4 or More; Future  - DME Mask and Supplies    2. Chronic pain syndrome  - Polysomnography, 4 or More; Future    3. Restless legs syndrome  - Polysomnography, 4 or More; Future    • The patient has signs and symptoms consistent with obstructive sleep apnea hypopnea syndrome. Will schedule a nocturnal polysomnogram or a home sleep apnea test (please " see plan).  • The risks of untreated sleep apnea were discussed with the patient at length. Patients with NATALIE are at increased risk of cardiovascular disease including coronary artery disease, systemic arterial hypertension, pulmonary arterial hypertension, cardiac arrhythmias, and stroke. NATALIE patients have an increased risk of motor vehicle accidents, type 2 diabetes, chronic kidney disease, and non-alcoholic liver disease. The patient was advised to avoid driving a motor vehicle when drowsy.  • Have advised the patient to follow up with the appropriate healthcare practitioners for all other medical problems and issues.    RETURN TO CLINIC: Return for 2 wks after discuss sleep study with any provider.  My total time spent caring for the patient on the day of the encounter was 45minutes. This includes time time spent on a thorough chart review including other physician notes, any type of sleep study, as well as critical labs and pulmonary and cardiac studies.  Additionally it includes discussions of good sleep hygiene, stimulus control and going over the need for consistency in terms of sleep preparation and practice.     Please note that this dictation was created using voice recognition software.  I have made every reasonable attempt to correct obvious errors, I expect that there are errors of grammar and possibly content that I did not discover before finalizing this note.

## 2022-07-03 ENCOUNTER — SLEEP STUDY (OUTPATIENT)
Dept: SLEEP MEDICINE | Facility: MEDICAL CENTER | Age: 52
End: 2022-07-03
Attending: PREVENTIVE MEDICINE
Payer: MEDICAID

## 2022-07-03 DIAGNOSIS — G47.33 OSA ON CPAP: ICD-10-CM

## 2022-07-03 DIAGNOSIS — G89.4 CHRONIC PAIN SYNDROME: ICD-10-CM

## 2022-07-03 DIAGNOSIS — G25.81 RESTLESS LEGS SYNDROME: ICD-10-CM

## 2022-07-03 PROCEDURE — 95810 POLYSOM 6/> YRS 4/> PARAM: CPT | Performed by: PREVENTIVE MEDICINE

## 2022-07-05 NOTE — PROCEDURES
MONTAGE: Standard  STUDY TYPE: Diagnostic    RECORDING TECHNIQUE:   After the scalp was prepared, gold plated electrodes were applied to the scalp according to the International 10-20 System. EEG (electroencephalogram) was continuously monitored from the O1-M2, O2-M1, C3-M2, C4-M1, F3-M2, and F4-M1. EOGs (electrooculograms) were monitored by electrodes placed at the left and right outer canthi. Chin EMG (electromyogram) was monitored by electrodes placed on the mentalis and sub-mentalis muscles. Nasal and oral airflow were monitored using a triple port thermocouple as well as oronasal pressure transducer. Respiratory effort was measured by inductive plethysmography technology employing abdominal and thoracic belts. Blood oxygen saturation and pulse were monitored by pulse oximetry. Heart rhythm was monitored by surface electrocardiogram. Leg EMG was studied using surface electrodes placed on left and right anterior tibialis. A microphone was used to monitor tracheal sounds and snoring. Body position was monitored and documented by technician observation.   SCORING CRITERIA:   A modification of the AASM manual for scoring of sleep and associated events was used. Obstructive apneas were scored by cessation of airflow for at least 10 seconds with continuing respiratory effort. Central apneas were scored by cessation of airflow for at least 10 seconds with no respiratory effort. Hypopneas were scored by a 30% or more reduction in airflow for at least 10 seconds accompanied by arterial oxygen desaturation of 3% or an arousal. For CMS (Medicare) patients, per AASM rule 1B, hypopneas are scored by 30% with mild reduction in airflow for at least 10 seconds accompanied by arterial saturation decreased at 4%.    STUDY DATA:  Study start time was 07:55:34 PM. Diagnostic recording time was 9h 18.0m with a total sleep time of 7h 20.5m resulting in a sleep efficiency of 78.94%%. Sleep latency from the start of the study was 51  minutes and the latency from sleep to REM was 256 minutes. In total,29 arousals were scored for an arousal index of 4.0.  Respiratory:  There were a total of 6 apneas consisting of 1 obstructive apneas, 0 mixed apneas, and 5 central apneas. A total of 19 hypopneas were scored. The apnea index was 0.82 per hour and the hypopnea index was 2.59 per hour resulting in an overall AHI of 5.0. AHI during rem was 12.0 and AHI while supine was 3.76.  Oximetry:  There was a mean oxygen saturation of 93.0%. The minimum oxygen saturation in NREM was 85.0 % and in REM was 87.0. The patient spent 3.4 minutes of TST with SaO2 <88%.  Cardiac:  The highest heart rate seen while awake was 92 BPM while the highest heart rate during sleep was 82 BPM with an average sleeping heart rate of 59 BPM.  Limb Movements:  There were a total of 169 PLMs during sleep which resulted in a PLMS index of 23.0. Of these, 10 were associated with arousals which resulted in a PLMS arousal index of 1.4.  Titration:   This was a fully attended sleep study. This test was technically adequate. This patient was NOT TITRATED AS HE DID NOT meet split criteria.  Assessment:   Mild Obstructive Sleep Apnea Hypopnea -   overall  AHI  5.0, AHI in REM  12. No significant nocturnal oxygen desaturation - mauro saturation 85% - saturations <88% below for 3.4 minutes of TST.    PLMs INDEX of 23 clinical correlation required    Recommendation: This patient will do well on Pap therapy but also should be considered for alternative therapies as well.  This patient has mild sleep apnea he is also a candidate for oral appliance therapy.        HYPNOGRAM:

## 2022-07-18 ENCOUNTER — OFFICE VISIT (OUTPATIENT)
Dept: SLEEP MEDICINE | Facility: MEDICAL CENTER | Age: 52
End: 2022-07-18
Payer: MEDICAID

## 2022-07-18 VITALS
BODY MASS INDEX: 25.62 KG/M2 | DIASTOLIC BLOOD PRESSURE: 70 MMHG | WEIGHT: 173 LBS | OXYGEN SATURATION: 96 % | RESPIRATION RATE: 16 BRPM | HEART RATE: 79 BPM | SYSTOLIC BLOOD PRESSURE: 126 MMHG | HEIGHT: 69 IN

## 2022-07-18 DIAGNOSIS — G47.33 OSA (OBSTRUCTIVE SLEEP APNEA): ICD-10-CM

## 2022-07-18 DIAGNOSIS — F51.04 CHRONIC INSOMNIA: ICD-10-CM

## 2022-07-18 DIAGNOSIS — Z78.9 NONSMOKER: ICD-10-CM

## 2022-07-18 PROBLEM — N40.1 LOWER URINARY TRACT SYMPTOMS DUE TO BENIGN PROSTATIC HYPERPLASIA: Status: ACTIVE | Noted: 2022-04-06

## 2022-07-18 PROBLEM — N52.9 PRIMARY ERECTILE DYSFUNCTION: Status: ACTIVE | Noted: 2022-04-12

## 2022-07-18 PROCEDURE — 99214 OFFICE O/P EST MOD 30 MIN: CPT | Performed by: NURSE PRACTITIONER

## 2022-07-18 ASSESSMENT — FIBROSIS 4 INDEX: FIB4 SCORE: 0.88

## 2022-07-18 ASSESSMENT — PATIENT HEALTH QUESTIONNAIRE - PHQ9: CLINICAL INTERPRETATION OF PHQ2 SCORE: 0

## 2022-07-18 NOTE — PROGRESS NOTES
Chief Complaint   Patient presents with   • Results     Sleep Study        HPI:  Singh Torres is a 52 y.o. year old male here today for follow-up on NATALIE review and sleep study results.  Last OV 6/2/22 with Dr. Barrera     Currently using APAP @ 5-8cm H20 nightly; RESMED; device obtained ?.  Compliance report not available today but reviewing sleep study results.  Diagnostic sleep study 7/3/2022 indicated 78% sleep efficiency, overall AHI 3.41/h, REM AHI 12.0, O2 mauro 85%.  Less than 88% for 3.4 minutes of testing time, average heart rate 59 bpm.  PLMS index of 23.0.  This does not note sleep apnea.  Reviewed finds with patient.  Due to his history of chronic pain and migraines patient has benefited from sleep apnea therapy.  Recommend continued use of auto CPAP 5 to 8 cm with monitoring of symptoms.  Reviewing his jensen on his phone for usage notes generally an AHI of 5 or less but significant mask leak.  He is currently using a nasal mask only.  We reviewed a fullface mask and possible mask fit.  He is amenable to this.  He notes initially starting therapy due to snoring and restless leg symptoms.  He is currently using Mirapex 0.25 mg prior to bedtime.  He remains on Ambien 10 mg nightly that he generally takes between 6 and 7 PM.  He will go to bed between 8 and 11 PM.  He notes his sleep routine to be changing recently due to his daughter moving and who also has a 6-month-old child and awaiting the birth of another child.    He notes a history of chronic pain and also brain trauma.  He is currently using Norco 2 times daily both prescribed up to 5 times daily.  He uses gabapentin prior to bedtime and Topamax.  If he needs pain control he will use 2 Tylenol's prior to bedtime.  He does not use Norco at night.  He is very compliant to his medications.  He denies alcohol use.  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    Alisson is currently overseeing his pain medications.    He is also followed by psychiatry for history of depression/anxiety.      He denies cardiac or respiratory symptoms today.    Sleep hx:   [facility]   Outpatient diagnostic home sleep test   03/15/2022  AHI 4.3   Oxygen Mauro: 84%   Note patient was diagnosed with mild sleep apnea (incorrect diagnosis.) The patient then went on to have a full night titration in lab that was done at [facility].      04/05/2020  Overall treatment AHI:6.0  Oxygen mauro 85.0%  Patient was titrated on CPAP of 5 and 6cm H2O      ROS: As per HPI and otherwise negative if not stated.    Past Medical History:   Diagnosis Date   • Arthritis     back, arm   • ASTHMA    • Backpain 01/01/1991   • Bowel habit changes     diarrhea   • Breath shortness     in the past   • Dental disorder     rotting away   • Elbow injury    • Head injury 01/01/2004   • Heart burn    • Hemorrhagic disorder (HCC)     gums   • Indigestion    • Migraine with aura    • NATALIE (obstructive sleep apnea) 01/30/2012    bipap   • Pneumonia 01/01/2004   • Restless leg syndrome    • Sleep apnea    • Snoring    • Vertigo        Past Surgical History:   Procedure Laterality Date   • TURBINOPLASTY Right 6/28/2016    Procedure: TURBINOPLASTY;  Surgeon: Zaki Yu M.D.;  Location: SURGERY SAME DAY [facility];  Service:    • OTHER  2007    colonoscopy   • OTHER  2006    Nasal - deviated septum       Family History   Problem Relation Age of Onset   • Cancer Paternal Grandmother         breast       Social History     Socioeconomic History   • Marital status:      Spouse name: Not on file   • Number of children: Not on file   • Years of education: Not on file   • Highest education level: Not on file   Occupational History   • Not on file   Tobacco Use   • Smoking status: Never Smoker   • Smokeless tobacco: Never Used   Substance and Sexual Activity   • Alcohol use: No     Comment: occ  "  • Drug use: No   • Sexual activity: Yes     Partners: Female   Other Topics Concern   • Not on file   Social History Narrative    ** Merged History Encounter **          Social Determinants of Health     Financial Resource Strain: Not on file   Food Insecurity: Not on file   Transportation Needs: Not on file   Physical Activity: Not on file   Stress: Not on file   Social Connections: Not on file   Intimate Partner Violence: Not on file   Housing Stability: Not on file       Allergies as of 07/18/2022   • (No Known Allergies)        Vitals:  /70 (BP Location: Left arm, Patient Position: Sitting, BP Cuff Size: Adult)   Pulse 79   Resp 16   Ht 1.753 m (5' 9\")   Wt 78.5 kg (173 lb)   SpO2 96%     Current medications as of today   Current Outpatient Medications   Medication Sig Dispense Refill   • meclizine (ANTIVERT) 25 MG Tab Take 1 Tab by mouth 3 times a day as needed. 30 Tab 0   • gabapentin (NEURONTIN) 300 MG Cap Take 300 mg by mouth 3 times a day.     • aripiprazole (ABILIFY) 10 MG Tab Take 10 mg by mouth every day.     • albuterol (PROVENTIL) 2.5mg/3ml Nebu Soln solution for nebulization 2.5 mg by Nebulization route every four hours as needed for Shortness of Breath.     • hydrocodone/acetaminophen (NORCO)  MG Tab Take 1 Tab by mouth every four hours as needed. Indications: Moderate to Moderately Severe Pain     • albuterol (VENTOLIN OR PROVENTIL) 108 (90 BASE) MCG/ACT Aero Soln inhalation aerosol Inhale 2 Puffs by mouth every four hours as needed for Shortness of Breath.     • sumatriptan (IMITREX) 100 MG tablet Take 100 mg by mouth Once PRN for Migraine.     • topiramate (TOPAMAX) 50 MG tablet Take 100 mg by mouth every bedtime.     • zolpidem (AMBIEN) 10 MG Tab Take 10 mg by mouth at bedtime as needed for Sleep.     • ondansetron (ZOFRAN) 4 MG TABS Take 1 Tab by mouth every four hours as needed for Nausea/Vomiting. 20 Tab 0   • pramipexole (MIRAPEX) 0.25 MG Tab Take 0.25 mg by mouth every " bedtime.     • Multiple Vitamins-Minerals (MULTIVITAMIN GUMMIES ADULT PO) Take 2 Tabs by mouth every day.     • Multiple Vitamins-Minerals (AIRBORNE) Chew Tab Take 2 Tabs by mouth every day.       No current facility-administered medications for this visit.         Physical Exam:   Gen:           Alert and oriented, No apparent distress. Mood and affect appropriate, normal interaction with examiner.  Eyes:          PERRL, EOM intact, sclere white, conjunctive moist. Glasses.  Ears:          Not examined.   Hearing:     Grossly intact.  Nose:          Normal, no lesions or deformities.  Dentition:    mask  Oropharynx:   mask  Mallampati Classification: mask  Neck:        Supple, trachea midline, no masses.  Respiratory Effort: No intercostal retractions or use of accessory muscles.   Lung Auscultation:      Clear to auscultation bilaterally; no rales, rhonchi or wheezing.  CV:            Regular rate and rhythm. No murmurs, rubs or gallops.  Abd:           Not examined.   Lymphadenopathy: Not examined.  Gait and Station: Normal.  Digits and Nails: No clubbing, cyanosis, petechiae, or nodes.   Cranial Nerves: II-XII grossly intact.  Skin:        No rashes, lesions or ulcers noted.               Ext:           No cyanosis or edema.      Assessment:  1. NATALIE (obstructive sleep apnea)  DME Mask and Supplies   2. Chronic insomnia     3. BMI 25.0-25.9,adult     4. Nonsmoker         Immunizations:    Flu:9/24/21  Pneumovax 23:recommend at age 65  Prevnar 13:2021  COVID-19: 1/26/22, 5/13/21, 4/15/21    Plan:  1.  Patient sleep study did not reveal significant sleep apnea but he did require repeat sleep studies in the past to qualify for therapy due to ongoing symptoms.  Due to his chronic medical issues and ongoing opiate use recommend continued auto CPAP 5 to 8 cm nightly.  Patient will have a mask fit for a full facemask to improve overall therapy.  He is amenable to this.  DME mask/supplies  2.  Discussed sleep hygiene.   Patient will continue Ambien 10 mg nightly PCP can continue to oversee this with his chronic pain meds to avoid polypharmacy.  3.  Follow-up in 3 months for compliance check, sooner if needed.    Please note that this dictation was created using voice recognition software. I have made every reasonable attempt to correct obvious errors, but it is possible there are errors of grammar and possibly content that I did not discover before finalizing the note.

## 2022-07-18 NOTE — PATIENT INSTRUCTIONS
Recommend mask fit for full face mask due to leaking on nightly report per patient's CLAIRE - recheck compliance in 3 months

## 2022-07-18 NOTE — LETTER
NORMA Moreno  [facility]   [address] [place] [address]  Phone: [phone] - Fax:             Encounter Date: 7/18/2022  Provider: NORMA Moreno  Location of Care: [facility]  [address] [facility] [place] [address]      Patient:   Singh Torres   MR Number: [id number]   YOB: 1970     PROGRESS NOTE:  Chief Complaint   Patient presents with   • Results     Sleep Study        HPI:  Singh Torres is a 52 y.o. year old male here today for follow-up on NATALIE review and sleep study results.  Last OV 6/2/22 with Dr. Barrera     Currently using APAP @ 5-8cm H20 nightly; RESMED; device obtained ?.  Compliance report not available today but reviewing sleep study results.  Diagnostic sleep study 7/3/2022 indicated 78% sleep efficiency, overall AHI 3.41/h, REM AHI 12.0, O2 mauro 85%.  Less than 88% for 3.4 minutes of testing time, average heart rate 59 bpm.  PLMS index of 23.0.  This does not note sleep apnea.  Reviewed finds with patient.  Due to his history of chronic pain and migraines patient has benefited from sleep apnea therapy.  Recommend continued use of auto CPAP 5 to 8 cm with monitoring of symptoms.  Reviewing his jensen on his phone for usage notes generally an AHI of 5 or less but significant mask leak.  He is currently using a nasal mask only.  We reviewed a fullface mask and possible mask fit.  He is amenable to this.  He notes initially starting therapy due to snoring and restless leg symptoms.  He is currently using Mirapex 0.25 mg prior to bedtime.  He remains on Ambien 10 mg nightly that he generally takes between 6 and 7 PM.  He will go to bed between 8 and 11 PM.  He notes his sleep routine to be changing recently due to his daughter moving and who also has a 6-month-old child and awaiting the birth of another child.    He notes a history of chronic pain and  also brain trauma.  He is currently using Norco 2 times daily both prescribed up to 5 times daily.  He uses gabapentin prior to bedtime and Topamax.  If he needs pain control he will use 2 Tylenol's prior to bedtime.  He does not use Norco at night.  He is very compliant to his medications.  He denies alcohol use.  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  Dr. Vinson is currently overseeing his pain medications.    He is also followed by psychiatry for history of depression/anxiety.      He denies cardiac or respiratory symptoms today.    Sleep hx:   [facility]   Outpatient diagnostic home sleep test   03/15/2022  AHI 4.3   Oxygen Mauro: 84%   Note patient was diagnosed with mild sleep apnea (incorrect diagnosis.) The patient then went on to have a full night titration in lab that was done at [facility].      04/05/2020  Overall treatment AHI:6.0  Oxygen mauro 85.0%  Patient was titrated on CPAP of 5 and 6cm H2O      ROS: As per HPI and otherwise negative if not stated.    Past Medical History:   Diagnosis Date   • Arthritis     back, arm   • ASTHMA    • Backpain 01/01/1991   • Bowel habit changes     diarrhea   • Breath shortness     in the past   • Dental disorder     rotting away   • Elbow injury    • Head injury 01/01/2004   • Heart burn    • Hemorrhagic disorder (HCC)     gums   • Indigestion    • Migraine with aura    • NATALIE (obstructive sleep apnea) 01/30/2012    bipap   • Pneumonia 01/01/2004   • Restless leg syndrome    • Sleep apnea    • Snoring    • Vertigo        Past Surgical History:   Procedure Laterality Date   • TURBINOPLASTY Right 6/28/2016    Procedure: TURBINOPLASTY;  Surgeon: Zaki Yu M.D.;  Location: SURGERY SAME DAY [facility];  Service:    • OTHER  2007    colonoscopy   • OTHER  2006    Nasal - deviated septum       Family History   Problem Relation Age of Onset   •  "Cancer Paternal Grandmother         breast       Social History     Socioeconomic History   • Marital status:      Spouse name: Not on file   • Number of children: Not on file   • Years of education: Not on file   • Highest education level: Not on file   Occupational History   • Not on file   Tobacco Use   • Smoking status: Never Smoker   • Smokeless tobacco: Never Used   Substance and Sexual Activity   • Alcohol use: No     Comment: occ   • Drug use: No   • Sexual activity: Yes     Partners: Female   Other Topics Concern   • Not on file   Social History Narrative    ** Merged History Encounter **          Social Determinants of Health     Financial Resource Strain: Not on file   Food Insecurity: Not on file   Transportation Needs: Not on file   Physical Activity: Not on file   Stress: Not on file   Social Connections: Not on file   Intimate Partner Violence: Not on file   Housing Stability: Not on file       Allergies as of 07/18/2022   • (No Known Allergies)        Vitals:  /70 (BP Location: Left arm, Patient Position: Sitting, BP Cuff Size: Adult)   Pulse 79   Resp 16   Ht 1.753 m (5' 9\")   Wt 78.5 kg (173 lb)   SpO2 96%     Current medications as of today   Current Outpatient Medications   Medication Sig Dispense Refill   • meclizine (ANTIVERT) 25 MG Tab Take 1 Tab by mouth 3 times a day as needed. 30 Tab 0   • gabapentin (NEURONTIN) 300 MG Cap Take 300 mg by mouth 3 times a day.     • aripiprazole (ABILIFY) 10 MG Tab Take 10 mg by mouth every day.     • albuterol (PROVENTIL) 2.5mg/3ml Nebu Soln solution for nebulization 2.5 mg by Nebulization route every four hours as needed for Shortness of Breath.     • hydrocodone/acetaminophen (NORCO)  MG Tab Take 1 Tab by mouth every four hours as needed. Indications: Moderate to Moderately Severe Pain     • albuterol (VENTOLIN OR PROVENTIL) 108 (90 BASE) MCG/ACT Aero Soln inhalation aerosol Inhale 2 Puffs by mouth every four hours as needed for " Shortness of Breath.     • sumatriptan (IMITREX) 100 MG tablet Take 100 mg by mouth Once PRN for Migraine.     • topiramate (TOPAMAX) 50 MG tablet Take 100 mg by mouth every bedtime.     • zolpidem (AMBIEN) 10 MG Tab Take 10 mg by mouth at bedtime as needed for Sleep.     • ondansetron (ZOFRAN) 4 MG TABS Take 1 Tab by mouth every four hours as needed for Nausea/Vomiting. 20 Tab 0   • pramipexole (MIRAPEX) 0.25 MG Tab Take 0.25 mg by mouth every bedtime.     • Multiple Vitamins-Minerals (MULTIVITAMIN GUMMIES ADULT PO) Take 2 Tabs by mouth every day.     • Multiple Vitamins-Minerals (AIRBORNE) Chew Tab Take 2 Tabs by mouth every day.       No current facility-administered medications for this visit.         Physical Exam:   Gen:           Alert and oriented, No apparent distress. Mood and affect appropriate, normal interaction with examiner.  Eyes:          PERRL, EOM intact, sclere white, conjunctive moist. Glasses.  Ears:          Not examined.   Hearing:     Grossly intact.  Nose:          Normal, no lesions or deformities.  Dentition:    mask  Oropharynx:   mask  Mallampati Classification: mask  Neck:        Supple, trachea midline, no masses.  Respiratory Effort: No intercostal retractions or use of accessory muscles.   Lung Auscultation:      Clear to auscultation bilaterally; no rales, rhonchi or wheezing.  CV:            Regular rate and rhythm. No murmurs, rubs or gallops.  Abd:           Not examined.   Lymphadenopathy: Not examined.  Gait and Station: Normal.  Digits and Nails: No clubbing, cyanosis, petechiae, or nodes.   Cranial Nerves: II-XII grossly intact.  Skin:        No rashes, lesions or ulcers noted.               Ext:           No cyanosis or edema.      Assessment:  1. NATALIE (obstructive sleep apnea)  DME Mask and Supplies   2. Chronic insomnia     3. BMI 25.0-25.9,adult     4. Nonsmoker         Immunizations:    Flu:9/24/21  Pneumovax 23:recommend at age 64  Prevnar 13:2021  COVID-19: 1/26/22,  5/13/21, 4/15/21    Plan:  1.  Patient sleep study did not reveal significant sleep apnea but he did require repeat sleep studies in the past to qualify for therapy due to ongoing symptoms.  Due to his chronic medical issues and ongoing opiate use recommend continued auto CPAP 5 to 8 cm nightly.  Patient will have a mask fit for a full facemask to improve overall therapy.  He is amenable to this.  DME mask/supplies  2.  Discussed sleep hygiene.  Patient will continue Ambien 10 mg nightly PCP can continue to oversee this with his chronic pain meds to avoid polypharmacy.  3.  Follow-up in 3 months for compliance check, sooner if needed.    Please note that this dictation was created using voice recognition software. I have made every reasonable attempt to correct obvious errors, but it is possible there are errors of grammar and possibly content that I did not discover before finalizing the note.        Electronically signed by NORMA Moreno  on 07/18/22    Venancio Vinson M.D.  [address] [facility]  [place] [address]  Via Fax: [phone]

## 2022-11-01 ENCOUNTER — OFFICE VISIT (OUTPATIENT)
Dept: SLEEP MEDICINE | Facility: MEDICAL CENTER | Age: 52
End: 2022-11-01
Payer: MEDICAID

## 2022-11-01 VITALS
SYSTOLIC BLOOD PRESSURE: 106 MMHG | DIASTOLIC BLOOD PRESSURE: 76 MMHG | OXYGEN SATURATION: 94 % | BODY MASS INDEX: 25.03 KG/M2 | RESPIRATION RATE: 16 BRPM | HEART RATE: 90 BPM | WEIGHT: 169 LBS | HEIGHT: 69 IN

## 2022-11-01 DIAGNOSIS — G47.33 OSA ON CPAP: ICD-10-CM

## 2022-11-01 DIAGNOSIS — R68.2 MOUTH DRYNESS: ICD-10-CM

## 2022-11-01 PROCEDURE — 99214 OFFICE O/P EST MOD 30 MIN: CPT | Performed by: PREVENTIVE MEDICINE

## 2022-11-01 RX ORDER — TAMSULOSIN HYDROCHLORIDE 0.4 MG/1
0.4 CAPSULE ORAL
COMMUNITY

## 2022-11-01 RX ORDER — SILDENAFIL CITRATE 20 MG/1
20 TABLET ORAL PRN
COMMUNITY
End: 2023-05-08

## 2022-11-01 RX ORDER — ELUXADOLINE 100 MG/1
TABLET, FILM COATED ORAL
COMMUNITY

## 2022-11-01 RX ORDER — ESCITALOPRAM OXALATE 10 MG/1
10 TABLET ORAL DAILY
COMMUNITY

## 2022-11-01 RX ORDER — KETOROLAC TROMETHAMINE 30 MG/ML
INJECTION, SOLUTION INTRAMUSCULAR; INTRAVENOUS ONCE
COMMUNITY
End: 2023-05-08

## 2022-11-01 RX ORDER — LIDOCAINE 50 MG/G
OINTMENT TOPICAL PRN
COMMUNITY
End: 2023-05-08

## 2022-11-01 RX ORDER — SOLIFENACIN SUCCINATE 5 MG/1
10 TABLET, FILM COATED ORAL DAILY
COMMUNITY
End: 2023-05-08

## 2022-11-01 RX ORDER — DICYCLOMINE HCL 20 MG
20 TABLET ORAL EVERY 6 HOURS
COMMUNITY

## 2022-11-01 RX ORDER — FREMANEZUMAB-VFRM 225 MG/1.5ML
225 INJECTION SUBCUTANEOUS ONCE
COMMUNITY
End: 2023-05-08

## 2022-11-01 ASSESSMENT — FIBROSIS 4 INDEX: FIB4 SCORE: 0.88

## 2022-11-01 NOTE — PROGRESS NOTES
CHIEF COMPLAINT: Compliance check/Annual Visit  LAST SEEN: Dr. Barrera on 06/02/22  HISTORY OF PRESENT ILLNESS:  Singh Torres is a 52 y.o.male   who is here today to follow-up.     COMPLIANCE DATA: greater than 4 hours 30% of time   Machine type: AirSense 10 AutoSet   Date range: 10/01/22-10/30/22  AHI: 5.8   TIME USED: 5hrs 4mins   PRESSURE SETTINGS: min 5 and max 8   LEAK: 43.6 L per min     Patient is trying to use PAP therapy but is struggling severe mask leak.     Sleep hx:   TYPE: diagnostic   DATE: 07/3/22  Diagnostic:AHI: 3.4   Diagnostic  Oxygen Mauro: 85%    [facility]   Outpatient diagnostic home sleep test   03/15/2022  AHI 4.3   Oxygen Mauro: 84%   Note patient was diagnosed with mild sleep apnea (incorrect diagnosis.) The patient then went on to have a full night titration in lab that was done at [facility].      04/05/2020  Overall treatment AHI:6.0  Oxygen mauro 85.0%  Patient was titrated on CPAP of 5 and 6cm H2O    Significant comorbidities and modifying factors: see HPI  PROBLEM LIST:  Patient Active Problem List    Diagnosis Date Noted    Primary erectile dysfunction 04/12/2022    Lower urinary tract symptoms due to benign prostatic hyperplasia 04/06/2022    Hypertrophy, nasal, turbinate 06/28/2016    NATALIE (obstructive sleep apnea) 01/30/2012    Allergic rhinitis 01/30/2012    Fibromyalgia 01/30/2012    BPPV (benign paroxysmal positional vertigo) 03/05/2011    Chronic pain 03/05/2011     PAST MEDICAL HISTORY:  Past Medical History:   Diagnosis Date    Arthritis     back, arm    ASTHMA     Backpain 01/01/1991    Bowel habit changes     diarrhea    Breath shortness     in the past    Dental disorder     rotting away    Elbow injury     Head injury 01/01/2004    Heart burn     Hemorrhagic disorder (HCC)     gums    Indigestion     Migraine with aura     NATALIE (obstructive sleep apnea) 01/30/2012    bipap    Pneumonia 01/01/2004    Restless leg syndrome     Sleep apnea      Snoring     Vertigo       PAST SOCIAL HISTORY:  Past Surgical History:   Procedure Laterality Date    TURBINOPLASTY Right 6/28/2016    Procedure: TURBINOPLASTY;  Surgeon: Zaki Yu M.D.;  Location: SURGERY SAME DAY [facility];  Service:     OTHER  2007    colonoscopy    OTHER  2006    Nasal - deviated septum     PAST FAMILY HISTORY:  Family History   Problem Relation Age of Onset    Cancer Paternal Grandmother         breast     SOCIAL HISTORY:  Social History     Socioeconomic History    Marital status:      Spouse name: Not on file    Number of children: Not on file    Years of education: Not on file    Highest education level: Not on file   Occupational History    Not on file   Tobacco Use    Smoking status: Never    Smokeless tobacco: Never   Vaping Use    Vaping Use: Some days   Substance and Sexual Activity    Alcohol use: No     Comment: occ    Drug use: No    Sexual activity: Yes     Partners: Female   Other Topics Concern    Not on file   Social History Narrative    ** Merged History Encounter **          Social Determinants of Health     Financial Resource Strain: Not on file   Food Insecurity: Not on file   Transportation Needs: Not on file   Physical Activity: Not on file   Stress: Not on file   Social Connections: Not on file   Intimate Partner Violence: Not on file   Housing Stability: Not on file     ALLERGIES: Patient has no known allergies.  MEDICATIONS:  Biotene  Current Outpatient Medications   Medication Sig Dispense Refill    Eluxadoline (VIBERZI) 100 MG Tab Take  by mouth.      ketorolac (TORADOL) 60 MG/2ML Solution Inject  into the shoulder, thigh, or buttocks one time.      dicyclomine (BENTYL) 20 MG Tab Take 20 mg by mouth every 6 hours.      lidocaine (XYLOCAINE) 5 % Ointment Apply  topically as needed.      escitalopram (LEXAPRO) 10 MG Tab Take 10 mg by mouth every day.      Fremanezumab-vfrm (AJOVY) 225 MG/1.5ML Solution Prefilled Syringe Inject 225 mg under the skin one  "time.      Nasal Wash (ALKALOL NA) Administer  into affected nostril(S).      Metoprolol Succinate 25 MG Capsule ER 24 Hour Sprinkle Take  by mouth.      tamsulosin (FLOMAX) 0.4 MG capsule Take 0.4 mg by mouth 1/2 hour after breakfast.      sildenafil (REVATIO) 20 MG tablet Take 20 mg by mouth as needed.      solifenacin (VESICARE) 5 MG tablet Take 10 mg by mouth every day.      mupirocin (BACTROBAN) 2 % Ointment Apply 1 Application topically 2 times a day.      meclizine (ANTIVERT) 25 MG Tab Take 1 Tab by mouth 3 times a day as needed. 30 Tab 0    gabapentin (NEURONTIN) 300 MG Cap Take 300 mg by mouth 3 times a day.      aripiprazole (ABILIFY) 10 MG Tab Take 10 mg by mouth every day.      albuterol (PROVENTIL) 2.5mg/3ml Nebu Soln solution for nebulization 2.5 mg by Nebulization route every four hours as needed for Shortness of Breath.      hydrocodone/acetaminophen (NORCO)  MG Tab Take 1 Tab by mouth every four hours as needed. Indications: Moderate to Moderately Severe Pain      albuterol (VENTOLIN OR PROVENTIL) 108 (90 BASE) MCG/ACT Aero Soln inhalation aerosol Inhale 2 Puffs by mouth every four hours as needed for Shortness of Breath.      sumatriptan (IMITREX) 100 MG tablet Take 100 mg by mouth Once PRN for Migraine.      topiramate (TOPAMAX) 50 MG tablet Take 100 mg by mouth every bedtime.      zolpidem (AMBIEN) 10 MG Tab Take 10 mg by mouth at bedtime as needed for Sleep.      ondansetron (ZOFRAN) 4 MG TABS Take 1 Tab by mouth every four hours as needed for Nausea/Vomiting. 20 Tab 0    pramipexole (MIRAPEX) 0.25 MG Tab Take 0.25 mg by mouth every bedtime.      Multiple Vitamins-Minerals (MULTIVITAMIN GUMMIES ADULT PO) Take 2 Tabs by mouth every day. (Patient not taking: Reported on 11/1/2022)      Multiple Vitamins-Minerals (AIRBORNE) Chew Tab Take 2 Tabs by mouth every day. (Patient not taking: Reported on 11/1/2022)       No current facility-administered medications for this visit.    \"CURRENT " "RX\"    REVIEW OF SYSTEMS:  Constitutional: Denies weight loss, endorses chronic daytime fatigue  Eyes: Denies vision changes  Ears/Nose/Mouth/Throat: Denies rhinitis/nasal congestion, injury, decayed teeth/toothaches.  Cardiovascular: Denies chest pain, tightness, palpitations, swelling in legs/feet, difficulty breathing when lying down but gets better when sitting up.   Respiratory: Denies shortness of breath while awake,  Sleep: per HPI  Gastrointestinal: Denies  difficulty swallowing,  heartburn.  Genitourinary: REPORTS nocturia  Musculoskeletal: Denies painful joints, sore muscles, back pain.   Neurological: Denies frequent headaches,weakness, dizziness.    PHYSICAL EXAM/VITALS:  /76 (BP Location: Left arm, Patient Position: Sitting, BP Cuff Size: Large adult)   Pulse 90   Resp 16   Ht 1.753 m (5' 9\")   Wt 76.7 kg (169 lb)   SpO2 94%   BMI 24.96 kg/m²   Appearance: Well-nourished, well-developed,  looks stated age, no acute distress  Eyes:   EOMI  ENMT: MASKED   Neck: Supple, trachea midline  Respiratory effort:  No intercostal retractions or use of accessory muscles  Musculoskeletal:  Grossly normal; gait and station normal  Neurologic:  oriented to person, time, place, and purpose; judgement intact  Psychiatric:  No depression, anxiety, agitation    MEDICAL DECISION MAKING:  The medical record was reviewed as it pertains to this referral. This includes records from primary care,consultants notes, referral request, hospital records, labs and imaging. Any available diagnostic and titration nocturnal polysomnograms, home sleep apnea tests, continuous nocturnal oximetry results, multiple sleep latency tests, and recent compliance reports were reviewed with the patient.    ASSESSMENT/PLAN: This is 52 year old male who did not meet criteria for NATALIE but did require PAP in the past. PAP will be continued due to chronic medical issues and ongoing opioid use. Due to elevated AHI pressures were changed to min " 6 max 9.     1. NATALIE on CPAP  - DME Mask and Supplies  - DME Other    2. Mouth dryness  - DME Mask and Supplies  - DME Other    Other orders  - Eluxadoline (VIBERZI) 100 MG Tab; Take  by mouth.  - ketorolac (TORADOL) 60 MG/2ML Solution; Inject  into the shoulder, thigh, or buttocks one time.  - dicyclomine (BENTYL) 20 MG Tab; Take 20 mg by mouth every 6 hours.  - lidocaine (XYLOCAINE) 5 % Ointment; Apply  topically as needed.  - escitalopram (LEXAPRO) 10 MG Tab; Take 10 mg by mouth every day.  - Fremanezumab-vfrm (AJOVY) 225 MG/1.5ML Solution Prefilled Syringe; Inject 225 mg under the skin one time.  - Nasal Wash (ALKALOL NA); Administer  into affected nostril(S).  - Metoprolol Succinate 25 MG Capsule ER 24 Hour Sprinkle; Take  by mouth.  - tamsulosin (FLOMAX) 0.4 MG capsule; Take 0.4 mg by mouth 1/2 hour after breakfast.  - sildenafil (REVATIO) 20 MG tablet; Take 20 mg by mouth as needed.  - solifenacin (VESICARE) 5 MG tablet; Take 10 mg by mouth every day.  - mupirocin (BACTROBAN) 2 % Ointment; Apply 1 Application topically 2 times a day.    The patient has signs and symptoms consistent with obstructive sleep apnea hypopnea syndrome. Will schedule a nocturnal polysomnogram or a home sleep apnea test (please see plan).  The risks of untreated sleep apnea were discussed with the patient at length. Patients with NATALIE are at increased risk of cardiovascular disease including coronary artery disease, systemic arterial hypertension, pulmonary arterial hypertension, cardiac arrhythmias, and stroke. NATALIE patients have an increased risk of motor vehicle accidents, type 2 diabetes, chronic kidney disease, and non-alcoholic liver disease. The patient was advised to avoid driving a motor vehicle when drowsy.  Have advised the patient to follow up with the appropriate healthcare practitioners for all other medical problems and issues.    RETURN TO CLINIC: Return in about 1 year (around 11/1/2023) for Annual visit.    My total  time spent caring for the patient on the day of the encounter was 40 minutes. This includes time spent on a thorough chart review including other physician notes, all sleep studies, as well as critical labs and pulmonary and cardiac studies.  Additionally, it includes a thorough discussion of good sleep hygiene and stimulus control, as well as  the need for consistency in terms of sleep preparation and practice.    Please note that this dictation was created using voice recognition software.  I have made every reasonable attempt to correct obvious errors, I expect that there are errors of grammar and possibly content that I did not discover before finalizing this note.

## 2022-11-08 ENCOUNTER — HOSPITAL ENCOUNTER (OUTPATIENT)
Dept: LAB | Facility: MEDICAL CENTER | Age: 52
End: 2022-11-08
Attending: PHYSICIAN ASSISTANT
Payer: MEDICAID

## 2022-11-08 LAB — PSA SERPL-MCNC: 0.76 NG/ML (ref 0–4)

## 2022-11-08 PROCEDURE — 36415 COLL VENOUS BLD VENIPUNCTURE: CPT

## 2022-11-08 PROCEDURE — 84153 ASSAY OF PSA TOTAL: CPT

## 2023-05-08 ENCOUNTER — OFFICE VISIT (OUTPATIENT)
Dept: URGENT CARE | Facility: PHYSICIAN GROUP | Age: 53
End: 2023-05-08
Payer: MEDICAID

## 2023-05-08 ENCOUNTER — HOSPITAL ENCOUNTER (OUTPATIENT)
Facility: MEDICAL CENTER | Age: 53
End: 2023-05-08
Attending: FAMILY MEDICINE
Payer: MEDICAID

## 2023-05-08 VITALS
TEMPERATURE: 97.7 F | HEIGHT: 69 IN | BODY MASS INDEX: 25.48 KG/M2 | DIASTOLIC BLOOD PRESSURE: 70 MMHG | SYSTOLIC BLOOD PRESSURE: 108 MMHG | OXYGEN SATURATION: 100 % | RESPIRATION RATE: 18 BRPM | HEART RATE: 78 BPM | WEIGHT: 172 LBS

## 2023-05-08 DIAGNOSIS — R05.1 ACUTE COUGH: ICD-10-CM

## 2023-05-08 DIAGNOSIS — J02.9 PHARYNGITIS, UNSPECIFIED ETIOLOGY: ICD-10-CM

## 2023-05-08 PROCEDURE — 99203 OFFICE O/P NEW LOW 30 MIN: CPT | Performed by: FAMILY MEDICINE

## 2023-05-08 PROCEDURE — 87070 CULTURE OTHR SPECIMN AEROBIC: CPT

## 2023-05-08 RX ORDER — CHLORHEXIDINE GLUCONATE ORAL RINSE 1.2 MG/ML
SOLUTION DENTAL
COMMUNITY

## 2023-05-08 RX ORDER — AMLODIPINE BESYLATE 10 MG/1
10 TABLET ORAL
COMMUNITY
Start: 2023-02-13

## 2023-05-08 RX ORDER — PANTOPRAZOLE SODIUM 40 MG/1
40 TABLET, DELAYED RELEASE ORAL
COMMUNITY
Start: 2023-04-16

## 2023-05-08 RX ORDER — QUETIAPINE FUMARATE 400 MG/1
800 TABLET, FILM COATED ORAL
COMMUNITY
Start: 2023-05-06

## 2023-05-08 RX ORDER — PENICILLIN V POTASSIUM 500 MG/1
TABLET ORAL
COMMUNITY
End: 2023-05-08

## 2023-05-08 RX ORDER — TADALAFIL 5 MG/1
TABLET ORAL
COMMUNITY

## 2023-05-08 RX ORDER — DEXTROMETHORPHAN HYDROBROMIDE AND PROMETHAZINE HYDROCHLORIDE 15; 6.25 MG/5ML; MG/5ML
5 SYRUP ORAL EVERY 4 HOURS PRN
Qty: 120 ML | Refills: 0 | Status: SHIPPED | OUTPATIENT
Start: 2023-05-08

## 2023-05-08 RX ORDER — LIDOCAINE HYDROCHLORIDE 20 MG/ML
15 SOLUTION OROPHARYNGEAL EVERY 4 HOURS PRN
Qty: 120 ML | Refills: 0 | Status: SHIPPED | OUTPATIENT
Start: 2023-05-08

## 2023-05-08 ASSESSMENT — ENCOUNTER SYMPTOMS
MYALGIAS: 0
EYE DISCHARGE: 0
EYE REDNESS: 0
VOMITING: 0
NAUSEA: 0
WEIGHT LOSS: 0

## 2023-05-08 NOTE — PROGRESS NOTES
"Subjective     Singh Torres is a 53 y.o. male who presents with Cough (X 3 days ) and Pharyngitis (X 1 day )            3 days productive cough no blood in sputum.  2 days of sore throat.  No fever.  Tolerating fluids with normal urine output.  No shortness of breath or wheezing. Exposed to grandson with ear infection.  Home C19 test negative.  No other aggravating or alleviating factors.      Review of Systems   Constitutional:  Negative for malaise/fatigue and weight loss.   Eyes:  Negative for discharge and redness.   Gastrointestinal:  Negative for nausea and vomiting.   Musculoskeletal:  Negative for joint pain and myalgias.   Skin:  Negative for itching and rash.            Objective     /70   Pulse 78   Temp 36.5 °C (97.7 °F) (Temporal)   Resp 18   Ht 1.753 m (5' 9\")   Wt 78 kg (172 lb)   SpO2 100%   BMI 25.40 kg/m²      Physical Exam  Constitutional:       General: He is not in acute distress.     Appearance: He is well-developed.   HENT:      Head: Normocephalic and atraumatic.      Right Ear: Tympanic membrane normal.      Left Ear: Tympanic membrane normal.      Nose: Nose normal. No congestion.      Mouth/Throat:      Mouth: Mucous membranes are moist.      Pharynx: Posterior oropharyngeal erythema present.   Eyes:      Conjunctiva/sclera: Conjunctivae normal.   Cardiovascular:      Rate and Rhythm: Normal rate and regular rhythm.      Heart sounds: Normal heart sounds. No murmur heard.  Pulmonary:      Effort: Pulmonary effort is normal.      Breath sounds: Normal breath sounds. No wheezing.   Skin:     General: Skin is warm and dry.      Findings: No rash.   Neurological:      Mental Status: He is alert.                           Assessment & Plan        1. Acute cough  promethazine-dextromethorphan (PROMETHAZINE-DM) 6.25-15 MG/5ML syrup      2. Pharyngitis, unspecified etiology  CULTURE THROAT    lidocaine (XYLOCAINE) 2 % Solution        Differential diagnosis, natural history, " supportive care, and indications for immediate follow-up were discussed.     Rapid strep testing is not available in clinic today.    Follow-up throat culture.  Discussed low probability strep and that I would prefer to wait on antibiotic until cultures completed.  Singh understands this and agrees.

## 2023-05-10 LAB
BACTERIA SPEC RESP CULT: NORMAL
SIGNIFICANT IND 70042: NORMAL
SITE SITE: NORMAL
SOURCE SOURCE: NORMAL

## 2023-05-14 ENCOUNTER — OFFICE VISIT (OUTPATIENT)
Dept: URGENT CARE | Facility: PHYSICIAN GROUP | Age: 53
End: 2023-05-14
Payer: MEDICAID

## 2023-05-14 ENCOUNTER — APPOINTMENT (OUTPATIENT)
Dept: RADIOLOGY | Facility: IMAGING CENTER | Age: 53
End: 2023-05-14
Attending: FAMILY MEDICINE
Payer: MEDICAID

## 2023-05-14 VITALS
SYSTOLIC BLOOD PRESSURE: 124 MMHG | HEART RATE: 77 BPM | RESPIRATION RATE: 14 BRPM | TEMPERATURE: 97.6 F | BODY MASS INDEX: 25.03 KG/M2 | HEIGHT: 69 IN | DIASTOLIC BLOOD PRESSURE: 74 MMHG | WEIGHT: 169 LBS | OXYGEN SATURATION: 96 %

## 2023-05-14 DIAGNOSIS — R05.1 ACUTE COUGH: ICD-10-CM

## 2023-05-14 DIAGNOSIS — J01.90 ACUTE BACTERIAL SINUSITIS: ICD-10-CM

## 2023-05-14 DIAGNOSIS — B96.89 ACUTE BACTERIAL SINUSITIS: ICD-10-CM

## 2023-05-14 DIAGNOSIS — J45.21 MILD INTERMITTENT ASTHMA WITH EXACERBATION: ICD-10-CM

## 2023-05-14 PROCEDURE — 99214 OFFICE O/P EST MOD 30 MIN: CPT | Performed by: FAMILY MEDICINE

## 2023-05-14 PROCEDURE — 71046 X-RAY EXAM CHEST 2 VIEWS: CPT | Mod: TC,FY | Performed by: FAMILY MEDICINE

## 2023-05-14 PROCEDURE — 3078F DIAST BP <80 MM HG: CPT | Performed by: FAMILY MEDICINE

## 2023-05-14 PROCEDURE — 3074F SYST BP LT 130 MM HG: CPT | Performed by: FAMILY MEDICINE

## 2023-05-14 PROCEDURE — 1125F AMNT PAIN NOTED PAIN PRSNT: CPT | Performed by: FAMILY MEDICINE

## 2023-05-14 RX ORDER — FREMANEZUMAB-VFRM 225 MG/1.5ML
INJECTION SUBCUTANEOUS
COMMUNITY
Start: 2023-05-12

## 2023-05-14 RX ORDER — BENZONATATE 200 MG/1
200 CAPSULE ORAL 3 TIMES DAILY PRN
COMMUNITY
Start: 2023-05-10

## 2023-05-14 RX ORDER — SOLIFENACIN SUCCINATE 10 MG/1
TABLET, FILM COATED ORAL
COMMUNITY
End: 2023-05-14

## 2023-05-14 RX ORDER — SOLIFENACIN SUCCINATE 10 MG/1
10 TABLET, FILM COATED ORAL
COMMUNITY
Start: 2023-04-28

## 2023-05-14 RX ORDER — METOPROLOL SUCCINATE 25 MG/1
25 TABLET, EXTENDED RELEASE ORAL DAILY
COMMUNITY
Start: 2023-03-03

## 2023-05-14 RX ORDER — PANTOPRAZOLE SODIUM 40 MG/1
TABLET, DELAYED RELEASE ORAL
COMMUNITY
End: 2023-05-14

## 2023-05-14 RX ORDER — AZITHROMYCIN 250 MG/1
TABLET, FILM COATED ORAL
Qty: 6 TABLET | Refills: 0 | Status: SHIPPED | OUTPATIENT
Start: 2023-05-14 | End: 2023-05-19

## 2023-05-14 RX ORDER — KETOROLAC TROMETHAMINE 30 MG/ML
INJECTION, SOLUTION INTRAMUSCULAR; INTRAVENOUS
COMMUNITY
Start: 2023-05-13

## 2023-05-14 RX ORDER — METHYLPREDNISOLONE 4 MG/1
TABLET ORAL
Qty: 21 TABLET | Refills: 0 | Status: SHIPPED | OUTPATIENT
Start: 2023-05-14 | End: 2023-05-20

## 2023-05-14 NOTE — PROGRESS NOTES
Chief Complaint:    Chief Complaint   Patient presents with    Cough     X1week        History of Present Illness:    Daughter present. Cough x 8 days, coughing up purulent mucus. Has nasal symptoms with purulent mucus from nose. Some sore throat. Some shortness of breath. Has Asthma, has inhaler and nebulizer to use. No definite fever. Reports steroid pack (probably Medrol Dose Xavi) has helped lungs in the past. Saw other provider on 5/8/23, was rx'd Lidocaine solution and Promethazine-DM syrup, visit reviewed. Then saw PCP on 5/10/23 and was rx'd Benzonatate 200 mg. Nothing has helped.      Past Medical History:    Past Medical History:   Diagnosis Date    Arthritis     back, arm    ASTHMA     Backpain 01/01/1991    Bowel habit changes     diarrhea    Breath shortness     in the past    Dental disorder     rotting away    Elbow injury     Head injury 01/01/2004    Heart burn     Hemorrhagic disorder (HCC)     gums    Indigestion     Migraine with aura     NATALIE (obstructive sleep apnea) 01/30/2012    bipap    Pneumonia 01/01/2004    Restless leg syndrome     Sleep apnea     Snoring     Vertigo      Past Surgical History:    Past Surgical History:   Procedure Laterality Date    TURBINOPLASTY Right 6/28/2016    Procedure: TURBINOPLASTY;  Surgeon: Zaki Yu M.D.;  Location: SURGERY SAME DAY [facility];  Service:     OTHER  2007    colonoscopy    OTHER  2006    Nasal - deviated septum     Social History:    Social History     Socioeconomic History    Marital status:      Spouse name: Not on file    Number of children: Not on file    Years of education: Not on file    Highest education level: Not on file   Occupational History    Not on file   Tobacco Use    Smoking status: Never    Smokeless tobacco: Never   Vaping Use    Vaping Use: Some days   Substance and Sexual Activity    Alcohol use: No     Comment: occ    Drug use: No    Sexual activity: Yes     Partners: Female   Other Topics Concern    Not on  file   Social History Narrative    ** Merged History Encounter **          Social Determinants of Health     Financial Resource Strain: Not on file   Food Insecurity: Not on file   Transportation Needs: Not on file   Physical Activity: Not on file   Stress: Not on file   Social Connections: Not on file   Intimate Partner Violence: Not on file   Housing Stability: Not on file     Family History:    Family History   Problem Relation Age of Onset    Cancer Paternal Grandmother         breast     Medications:    Current Outpatient Medications on File Prior to Visit   Medication Sig Dispense Refill    AJOVY 225 MG/1.5ML Solution Prefilled Syringe INJECT 225 MG UNDER THE SKIN EVERY MONTH AS DIRECTED      benzonatate (TESSALON) 200 MG capsule Take 200 mg by mouth 3 times a day as needed.      ketorolac (TORADOL) 60 MG/2ML Solution       solifenacin (VESICARE) 10 MG tablet Take 10 mg by mouth every day.      metoprolol SR (TOPROL XL) 25 MG TABLET SR 24 HR Take 25 mg by mouth every day.      amLODIPine (NORVASC) 10 MG Tab Take 10 mg by mouth every day.      chlorhexidine (PERIDEX) 0.12 % Solution chlorhexidine gluconate 0.12 % mouthwash      pantoprazole (PROTONIX) 40 MG Tablet Delayed Response Take 40 mg by mouth every morning before breakfast. 30 minutes before breakfast      quetiapine (SEROQUEL) 400 MG tablet Take 800 mg by mouth at bedtime.      tadalafil (CIALIS) 5 MG tablet tadalafil 5 mg tablet   Take 1 tablet every day by oral route for 90 days.      lidocaine (XYLOCAINE) 2 % Solution Take 15 mL by mouth every four hours as needed for Throat/Mouth Pain. Gargle and spit every 4 hours as needed 120 mL 0    promethazine-dextromethorphan (PROMETHAZINE-DM) 6.25-15 MG/5ML syrup Take 5 mL by mouth every four hours as needed for Cough. 120 mL 0    Eluxadoline (VIBERZI) 100 MG Tab Take  by mouth.      dicyclomine (BENTYL) 20 MG Tab Take 20 mg by mouth every 6 hours.      escitalopram (LEXAPRO) 10 MG Tab Take 10 mg by mouth  "every day.      tamsulosin (FLOMAX) 0.4 MG capsule Take 0.4 mg by mouth 1/2 hour after breakfast.      meclizine (ANTIVERT) 25 MG Tab Take 1 Tab by mouth 3 times a day as needed. 30 Tab 0    gabapentin (NEURONTIN) 300 MG Cap Take 300 mg by mouth 3 times a day.      aripiprazole (ABILIFY) 10 MG Tab Take 10 mg by mouth every day.      albuterol (PROVENTIL) 2.5mg/3ml Nebu Soln solution for nebulization 2.5 mg by Nebulization route every four hours as needed for Shortness of Breath.      hydrocodone/acetaminophen (NORCO)  MG Tab Take 1 Tab by mouth every four hours as needed. Indications: Moderate to Moderately Severe Pain      albuterol (VENTOLIN OR PROVENTIL) 108 (90 BASE) MCG/ACT Aero Soln inhalation aerosol Inhale 2 Puffs by mouth every four hours as needed for Shortness of Breath.      sumatriptan (IMITREX) 100 MG tablet Take 100 mg by mouth Once PRN for Migraine.      topiramate (TOPAMAX) 50 MG tablet Take 100 mg by mouth every bedtime.      zolpidem (AMBIEN) 10 MG Tab Take 10 mg by mouth at bedtime as needed for Sleep.      ondansetron (ZOFRAN) 4 MG TABS Take 1 Tab by mouth every four hours as needed for Nausea/Vomiting. 20 Tab 0    pramipexole (MIRAPEX) 0.25 MG Tab Take 0.25 mg by mouth every bedtime.       No current facility-administered medications on file prior to visit.     Allergies:    No Known Allergies      Vitals:    Vitals:    05/14/23 1222   BP: 124/74   Pulse: 77   Resp: 14   Temp: 36.4 °C (97.6 °F)   TempSrc: Temporal   SpO2: 96%   Weight: 76.7 kg (169 lb)   Height: 1.753 m (5' 9\")         Physical Exam:    Constitutional: Vital signs reviewed. Appears well-developed and well-nourished. Frequent coughing.  Eyes: Sclera white, conjunctivae clear.   ENT: External ears normal. External auditory canals normal without discharge. TMs translucent and non-bulging. Wears bilateral hearing aids. Lips/teeth are normal. Oral mucosa pink and moist. Posterior pharynx: mild irritated appearance.  Neck: Neck " supple.   Cardiovascular: Regular rate and rhythm. No murmur.  Pulmonary/Chest: Respirations non-labored. Clear to auscultation bilaterally.  Musculoskeletal: Normal gait. No muscular atrophy or weakness.  Neurological: Alert and oriented to person, place, and time. Muscle tone normal. Coordination normal.   Skin: No rashes or lesions. Warm, dry, normal turgor.  Psychiatric: Normal mood and affect. Behavior is normal. Judgment and thought content normal.       Diagnostics:    DX-CHEST-2 VIEWS  Order: [id number]  Status: Final result     Visible to patient: Yes (seen)     Next appt: 2023 at 08:00 AM in Pulmonary and Sleep Medicine (Colleen Barrera M.D.)     Dx: Acute cough     0 Result Notes  Details    Reading Physician Reading Date Result Priority   Derick Berrios M.D.  [phone] 2023 Urgent Care     Narrative & Impression     2023 12:53 PM     HISTORY/REASON FOR EXAM:  Cough; Room 4. Cough x 8 days.        TECHNIQUE/EXAM DESCRIPTION AND NUMBER OF VIEWS:  Two views of the chest.     COMPARISON:  2/3/2016     FINDINGS:     Cardiomediastinal silhouette is normal.     No focal consolidation, pleural effusion, pulmonary edema or pneumothorax.     No acute osseous abnormality.     IMPRESSION:     No acute cardiopulmonary abnormality.     I personally reviewed the images. Rad report reviewed with him and copy of report to him.      Assessment / Plan & Medical Decision Makin. Acute cough  - DX-CHEST-2 VIEWS; Future    2. Acute bacterial sinusitis  - azithromycin (ZITHROMAX) 250 MG Tab; 2 TABS BY MOUTH ON DAY 1, 1 TAB ON DAYS 2-5.  Dispense: 6 Tablet; Refill: 0    3. Mild intermittent asthma with exacerbation  - methylPREDNISolone (MEDROL DOSEPAK) 4 MG Tablet Therapy Pack; TAKE AS DIRECTED ON PACKAGE.  Dispense: 21 Tablet; Refill: 0       Discussed with them DDX, management options, and risks, benefits, and alternatives to treatment plan agreed upon.    Daughter present. Cough x 8 days, coughing  up purulent mucus. Has nasal symptoms with purulent mucus from nose. Some sore throat. Some shortness of breath. Has Asthma, has inhaler and nebulizer to use. No definite fever. Reports steroid pack (probably Medrol Dose Xavi) has helped lungs in the past. Saw other provider on 5/8/23, was rx'd Lidocaine solution and Promethazine-DM syrup, visit reviewed. Then saw PCP on 5/10/23 and was rx'd Benzonatate 200 mg. Nothing has helped.    Frequent coughing. Posterior pharynx: mild irritated appearance.    CXR: No acute cardiopulmonary abnormality.    Complicated condition due to persisting symptoms despite treatment with other providers on 5/8/23 and 5/10/23.    Asthma - chronic condition with acute exacerbation.     Agreeable to medications prescribed.    Discussed expected course of duration, time for improvement, and to seek follow-up in Emergency Room, urgent care, or with PCP if getting worse at any time or not improving within expected time frame.

## 2023-08-17 ENCOUNTER — HOSPITAL ENCOUNTER (OUTPATIENT)
Dept: LAB | Facility: MEDICAL CENTER | Age: 53
End: 2023-08-17
Attending: PSYCHIATRY & NEUROLOGY
Payer: MEDICAID

## 2023-08-17 LAB
ALBUMIN SERPL BCP-MCNC: 4.2 G/DL (ref 3.2–4.9)
ALBUMIN/GLOB SERPL: 1.8 G/DL
ALP SERPL-CCNC: 104 U/L (ref 30–99)
ALT SERPL-CCNC: 10 U/L (ref 2–50)
ANION GAP SERPL CALC-SCNC: 10 MMOL/L (ref 7–16)
APPEARANCE UR: CLEAR
AST SERPL-CCNC: 16 U/L (ref 12–45)
BASOPHILS # BLD AUTO: 0.8 % (ref 0–1.8)
BASOPHILS # BLD: 0.04 K/UL (ref 0–0.12)
BILIRUB SERPL-MCNC: 0.2 MG/DL (ref 0.1–1.5)
BILIRUB UR QL STRIP.AUTO: NEGATIVE
BUN SERPL-MCNC: 18 MG/DL (ref 8–22)
CALCIUM ALBUM COR SERPL-MCNC: 8 MG/DL (ref 8.5–10.5)
CALCIUM SERPL-MCNC: 8.2 MG/DL (ref 8.5–10.5)
CHLORIDE SERPL-SCNC: 109 MMOL/L (ref 96–112)
CHOLEST SERPL-MCNC: 179 MG/DL (ref 100–199)
CO2 SERPL-SCNC: 20 MMOL/L (ref 20–33)
COLOR UR: YELLOW
CREAT SERPL-MCNC: 1.24 MG/DL (ref 0.5–1.4)
EOSINOPHIL # BLD AUTO: 0.13 K/UL (ref 0–0.51)
EOSINOPHIL NFR BLD: 2.5 % (ref 0–6.9)
ERYTHROCYTE [DISTWIDTH] IN BLOOD BY AUTOMATED COUNT: 43.1 FL (ref 35.9–50)
EST. AVERAGE GLUCOSE BLD GHB EST-MCNC: 108 MG/DL
FASTING STATUS PATIENT QL REPORTED: NORMAL
GFR SERPLBLD CREATININE-BSD FMLA CKD-EPI: 69 ML/MIN/1.73 M 2
GLOBULIN SER CALC-MCNC: 2.3 G/DL (ref 1.9–3.5)
GLUCOSE SERPL-MCNC: 94 MG/DL (ref 65–99)
GLUCOSE UR STRIP.AUTO-MCNC: NEGATIVE MG/DL
HBA1C MFR BLD: 5.4 % (ref 4–5.6)
HCT VFR BLD AUTO: 41.1 % (ref 42–52)
HDLC SERPL-MCNC: 36 MG/DL
HGB BLD-MCNC: 13.8 G/DL (ref 14–18)
IMM GRANULOCYTES # BLD AUTO: 0.03 K/UL (ref 0–0.11)
IMM GRANULOCYTES NFR BLD AUTO: 0.6 % (ref 0–0.9)
KETONES UR STRIP.AUTO-MCNC: ABNORMAL MG/DL
LDLC SERPL CALC-MCNC: 73 MG/DL
LEUKOCYTE ESTERASE UR QL STRIP.AUTO: NEGATIVE
LYMPHOCYTES # BLD AUTO: 1.6 K/UL (ref 1–4.8)
LYMPHOCYTES NFR BLD: 30.9 % (ref 22–41)
MCH RBC QN AUTO: 30.5 PG (ref 27–33)
MCHC RBC AUTO-ENTMCNC: 33.6 G/DL (ref 32.3–36.5)
MCV RBC AUTO: 90.9 FL (ref 81.4–97.8)
MICRO URNS: ABNORMAL
MONOCYTES # BLD AUTO: 0.48 K/UL (ref 0–0.85)
MONOCYTES NFR BLD AUTO: 9.3 % (ref 0–13.4)
NEUTROPHILS # BLD AUTO: 2.9 K/UL (ref 1.82–7.42)
NEUTROPHILS NFR BLD: 55.9 % (ref 44–72)
NITRITE UR QL STRIP.AUTO: NEGATIVE
NRBC # BLD AUTO: 0 K/UL
NRBC BLD-RTO: 0 /100 WBC (ref 0–0.2)
PH UR STRIP.AUTO: 6 [PH] (ref 5–8)
PLATELET # BLD AUTO: 267 K/UL (ref 164–446)
PMV BLD AUTO: 10.6 FL (ref 9–12.9)
POTASSIUM SERPL-SCNC: 3.5 MMOL/L (ref 3.6–5.5)
PROT SERPL-MCNC: 6.5 G/DL (ref 6–8.2)
PROT UR QL STRIP: NEGATIVE MG/DL
RBC # BLD AUTO: 4.52 M/UL (ref 4.7–6.1)
RBC UR QL AUTO: NEGATIVE
SODIUM SERPL-SCNC: 139 MMOL/L (ref 135–145)
SP GR UR STRIP.AUTO: 1.03
T4 FREE SERPL-MCNC: 0.8 NG/DL (ref 0.93–1.7)
TRIGL SERPL-MCNC: 352 MG/DL (ref 0–149)
TSH SERPL DL<=0.005 MIU/L-ACNC: 2.33 UIU/ML (ref 0.38–5.33)
UROBILINOGEN UR STRIP.AUTO-MCNC: 1 MG/DL
WBC # BLD AUTO: 5.2 K/UL (ref 4.8–10.8)

## 2023-08-17 PROCEDURE — 84439 ASSAY OF FREE THYROXINE: CPT

## 2023-08-17 PROCEDURE — 80053 COMPREHEN METABOLIC PANEL: CPT

## 2023-08-17 PROCEDURE — 80307 DRUG TEST PRSMV CHEM ANLYZR: CPT

## 2023-08-17 PROCEDURE — 83036 HEMOGLOBIN GLYCOSYLATED A1C: CPT

## 2023-08-17 PROCEDURE — 81003 URINALYSIS AUTO W/O SCOPE: CPT

## 2023-08-17 PROCEDURE — 36415 COLL VENOUS BLD VENIPUNCTURE: CPT

## 2023-08-17 PROCEDURE — 80061 LIPID PANEL: CPT

## 2023-08-17 PROCEDURE — 84443 ASSAY THYROID STIM HORMONE: CPT

## 2023-08-17 PROCEDURE — 85025 COMPLETE CBC W/AUTO DIFF WBC: CPT

## 2023-08-17 PROCEDURE — G0480 DRUG TEST DEF 1-7 CLASSES: HCPCS

## 2023-08-19 LAB
AMPHET CTO UR CFM-MCNC: NEGATIVE NG/ML
BARBITURATES CTO UR CFM-MCNC: NEGATIVE NG/ML
BENZODIAZ CTO UR CFM-MCNC: NEGATIVE NG/ML
CANNABINOIDS CTO UR CFM-MCNC: NEGATIVE NG/ML
COCAINE CTO UR CFM-MCNC: NEGATIVE NG/ML
DRUG COMMENT 753798: NORMAL
METHADONE CTO UR CFM-MCNC: NEGATIVE NG/ML
OPIATES CTO UR CFM-MCNC: POSITIVE NG/ML
PCP CTO UR CFM-MCNC: NEGATIVE NG/ML
PROPOXYPH CTO UR CFM-MCNC: NEGATIVE NG/ML

## 2023-08-21 LAB
6MAM UR CFM-MCNC: <10 NG/ML
CODEINE UR CFM-MCNC: <20 NG/ML
HYDROCODONE UR CFM-MCNC: 1203 NG/ML
HYDROMORPHONE UR CFM-MCNC: 29 NG/ML
MORPHINE UR CFM-MCNC: <20 NG/ML
NORHYDROCODONE UR CFM-MCNC: 2907 NG/ML
NOROXYCODONE UR CFM-MCNC: <20 NG/ML
OPIATES UR NOROXYM Q0836: <20 NG/ML
OXYCODONE UR CFM-MCNC: <20 NG/ML
OXYMORPHONE UR CFM-MCNC: <20 NG/ML

## 2023-11-02 ENCOUNTER — APPOINTMENT (OUTPATIENT)
Dept: SLEEP MEDICINE | Facility: MEDICAL CENTER | Age: 53
End: 2023-11-02
Attending: PREVENTIVE MEDICINE
Payer: MEDICAID

## 2023-11-08 ENCOUNTER — HOSPITAL ENCOUNTER (OUTPATIENT)
Dept: LAB | Facility: MEDICAL CENTER | Age: 53
End: 2023-11-08
Attending: PHYSICIAN ASSISTANT
Payer: MEDICAID

## 2023-11-08 LAB — PSA SERPL-MCNC: 0.69 NG/ML (ref 0–4)

## 2023-11-08 PROCEDURE — 84153 ASSAY OF PSA TOTAL: CPT

## 2023-11-08 PROCEDURE — 36415 COLL VENOUS BLD VENIPUNCTURE: CPT

## 2023-11-10 ENCOUNTER — OFFICE VISIT (OUTPATIENT)
Dept: SLEEP MEDICINE | Facility: MEDICAL CENTER | Age: 53
End: 2023-11-10
Attending: NURSE PRACTITIONER
Payer: MEDICAID

## 2023-11-10 VITALS
OXYGEN SATURATION: 95 % | HEIGHT: 69 IN | SYSTOLIC BLOOD PRESSURE: 114 MMHG | RESPIRATION RATE: 16 BRPM | DIASTOLIC BLOOD PRESSURE: 82 MMHG | BODY MASS INDEX: 25.95 KG/M2 | HEART RATE: 99 BPM | WEIGHT: 175.2 LBS

## 2023-11-10 DIAGNOSIS — G47.33 OSA ON CPAP: ICD-10-CM

## 2023-11-10 PROCEDURE — 99212 OFFICE O/P EST SF 10 MIN: CPT | Performed by: NURSE PRACTITIONER

## 2023-11-10 PROCEDURE — 3079F DIAST BP 80-89 MM HG: CPT | Performed by: NURSE PRACTITIONER

## 2023-11-10 PROCEDURE — 3074F SYST BP LT 130 MM HG: CPT | Performed by: NURSE PRACTITIONER

## 2023-11-10 PROCEDURE — 99213 OFFICE O/P EST LOW 20 MIN: CPT | Performed by: NURSE PRACTITIONER

## 2023-11-10 ASSESSMENT — PATIENT HEALTH QUESTIONNAIRE - PHQ9: CLINICAL INTERPRETATION OF PHQ2 SCORE: 0

## 2023-11-10 ASSESSMENT — FIBROSIS 4 INDEX: FIB4 SCORE: 1.02

## 2024-04-07 ENCOUNTER — OFFICE VISIT (OUTPATIENT)
Dept: URGENT CARE | Facility: PHYSICIAN GROUP | Age: 54
End: 2024-04-07
Payer: MEDICAID

## 2024-04-07 VITALS
WEIGHT: 172 LBS | RESPIRATION RATE: 16 BRPM | SYSTOLIC BLOOD PRESSURE: 102 MMHG | TEMPERATURE: 97.1 F | DIASTOLIC BLOOD PRESSURE: 64 MMHG | BODY MASS INDEX: 25.48 KG/M2 | HEIGHT: 69 IN | OXYGEN SATURATION: 95 % | HEART RATE: 89 BPM

## 2024-04-07 DIAGNOSIS — T30.0 BURN OF SKIN DUE TO HOT OIL: ICD-10-CM
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
## 2024-04-07 PROCEDURE — 3078F DIAST BP <80 MM HG: CPT | Performed by: PHYSICIAN ASSISTANT

## 2024-04-07 PROCEDURE — 99214 OFFICE O/P EST MOD 30 MIN: CPT | Performed by: PHYSICIAN ASSISTANT

## 2024-04-07 PROCEDURE — 9199 PR SILVER SULFADIAZINE 1% [ERX 7224]: Performed by: PHYSICIAN ASSISTANT

## 2024-04-07 PROCEDURE — 3074F SYST BP LT 130 MM HG: CPT | Performed by: PHYSICIAN ASSISTANT

## 2024-04-07 RX ADMIN — Medication 1 G: at 15:16

## 2024-04-07 ASSESSMENT — FIBROSIS 4 INDEX: FIB4 SCORE: 1.04

## 2024-04-26 ASSESSMENT — ENCOUNTER SYMPTOMS
NEUROLOGICAL NEGATIVE: 1
BURN: 1
CONSTITUTIONAL NEGATIVE: 1
ROS SKIN COMMENTS: BURN

## 2024-04-26 NOTE — PROGRESS NOTES
"Subjective     Singh Torres is a very pleasant 54 y.o. male who presents with Burn (X approximately 1:30 pm pt. Splashed oil on R hand. )            Burn  This is a new problem. The current episode started today. The problem occurs constantly. The problem has been unchanged. He has tried nothing for the symptoms. The treatment provided no relief.       Review of Systems   Constitutional: Negative.    Skin:         Burn   Neurological: Negative.        Medications, Allergies, and current problem list reviewed today in Epic           Objective     /64   Pulse 89   Temp 36.2 °C (97.1 °F) (Temporal)   Resp 16   Ht 1.753 m (5' 9\")   Wt 78 kg (172 lb)   SpO2 95%   BMI 25.40 kg/m²      Physical Exam  Vitals and nursing note reviewed.   Constitutional:       General: He is not in acute distress.     Appearance: Normal appearance. He is well-developed. He is not diaphoretic.   HENT:      Head: Normocephalic.      Right Ear: Hearing and external ear normal.      Left Ear: Hearing and external ear normal.      Nose: Nose normal.      Mouth/Throat:      Mouth: Mucous membranes are moist.   Eyes:      General:         Right eye: No discharge.         Left eye: No discharge.      Conjunctiva/sclera: Conjunctivae normal.   Cardiovascular:      Rate and Rhythm: Normal rate and regular rhythm.   Pulmonary:      Effort: Pulmonary effort is normal. No respiratory distress.      Breath sounds: Normal breath sounds.   Musculoskeletal:      Cervical back: Normal range of motion and neck supple.   Skin:     General: Skin is warm and dry.      Comments: Superficial burn to the right thumb.  No deep tissue involvement.  No blisters.  No bony tenderness.  Distal neurovascular intact   Neurological:      General: No focal deficit present.      Mental Status: He is alert and oriented to person, place, and time. Mental status is at baseline.   Psychiatric:         Mood and Affect: Mood normal.         Behavior: Behavior " normal.         Thought Content: Thought content normal.         Judgment: Judgment normal.                             Assessment & Plan        1. Burn of skin due to hot oil  silver sulfADIAZINE (Silvadene) 1 % cream    silver sulfADIAZINE (SILVADENE) 1 % Cream        Superficial burn without deep tissue involvement.  No red flag symptoms or concerning exam findings.  Silvadene applied and clinic.  Tetanus up-to-date.  Wound care discussed, watch out for infection.      I personally reviewed prior external notes and test results pertinent to today's visit. Return to clinic or go to ED if symptoms worsen or persist. Red flag symptoms and indications for ED discussed at length. Patient/Parent/Guardian voices understanding.  AVS with post-visit instructions printed and provided or given verbally.  Follow-up with your primary care provider in 3-5 days. All side effects and potential interactions of prescribed medication discussed including allergic response, GI upset, tendon injury, rash, sedation, OCP effectiveness, etc.    Please note that this dictation was created using voice recognition software. I have made every reasonable attempt to correct obvious errors, but I expect that there are errors of grammar and possibly content that I did not discover before finalizing the note.

## 2024-05-24 ENCOUNTER — OFFICE VISIT (OUTPATIENT)
Dept: URGENT CARE | Facility: PHYSICIAN GROUP | Age: 54
End: 2024-05-24
Payer: MEDICAID

## 2024-05-24 VITALS
HEART RATE: 96 BPM | DIASTOLIC BLOOD PRESSURE: 68 MMHG | RESPIRATION RATE: 16 BRPM | BODY MASS INDEX: 25.33 KG/M2 | WEIGHT: 171 LBS | OXYGEN SATURATION: 98 % | SYSTOLIC BLOOD PRESSURE: 106 MMHG | HEIGHT: 69 IN | TEMPERATURE: 97 F

## 2024-05-24 DIAGNOSIS — J06.9 VIRAL UPPER RESPIRATORY TRACT INFECTION: ICD-10-CM

## 2024-05-24 DIAGNOSIS — R05.1 ACUTE COUGH: ICD-10-CM

## 2024-05-24 DIAGNOSIS — J45.21 MILD INTERMITTENT ASTHMA WITH ACUTE EXACERBATION: ICD-10-CM

## 2024-05-24 LAB
FLUAV RNA SPEC QL NAA+PROBE: NEGATIVE
FLUBV RNA SPEC QL NAA+PROBE: NEGATIVE
RSV RNA SPEC QL NAA+PROBE: NEGATIVE
SARS-COV-2 RNA RESP QL NAA+PROBE: NEGATIVE

## 2024-05-24 PROCEDURE — 3078F DIAST BP <80 MM HG: CPT

## 2024-05-24 PROCEDURE — 3074F SYST BP LT 130 MM HG: CPT

## 2024-05-24 PROCEDURE — 99214 OFFICE O/P EST MOD 30 MIN: CPT

## 2024-05-24 PROCEDURE — 87637 SARSCOV2&INF A&B&RSV AMP PRB: CPT | Mod: QW

## 2024-05-24 RX ORDER — KETOROLAC TROMETHAMINE 30 MG/ML
INJECTION, SOLUTION INTRAMUSCULAR; INTRAVENOUS
COMMUNITY

## 2024-05-24 RX ORDER — LORATADINE 10 MG/1
1 CAPSULE, LIQUID FILLED ORAL DAILY
Qty: 30 CAPSULE | Refills: 0 | Status: SHIPPED | OUTPATIENT
Start: 2024-05-24

## 2024-05-24 RX ORDER — BENZONATATE 100 MG/1
100 CAPSULE ORAL 3 TIMES DAILY PRN
Qty: 60 CAPSULE | Refills: 0 | Status: SHIPPED | OUTPATIENT
Start: 2024-05-24

## 2024-05-24 RX ORDER — METHYLPREDNISOLONE 4 MG/1
TABLET ORAL
Qty: 21 TABLET | Refills: 0 | Status: SHIPPED | OUTPATIENT
Start: 2024-05-24

## 2024-05-24 ASSESSMENT — ENCOUNTER SYMPTOMS
HEADACHES: 0
RHINORRHEA: 1
SORE THROAT: 1
FEVER: 0
SPUTUM PRODUCTION: 0
HEARTBURN: 0
MYALGIAS: 0
SHORTNESS OF BREATH: 1
CHILLS: 0
WHEEZING: 1
COUGH: 1
HEMOPTYSIS: 0

## 2024-05-24 ASSESSMENT — FIBROSIS 4 INDEX: FIB4 SCORE: 1.04

## 2024-05-24 ASSESSMENT — COPD QUESTIONNAIRES: COPD: 0

## 2024-05-24 NOTE — PROGRESS NOTES
Subjective:   Singh Torres is a very pleasant 54 y.o. male who presents for:    Chief Complaint   Patient presents with    Cough     X 3 days with congestion, S.O.B., chills.  Hx of Asthma.        HPI:    Cough  Episode onset: three days ago, the patient developed cough, congestion, and chills. The cough is Non-productive. Associated symptoms include nasal congestion, postnasal drip, rhinorrhea, a sore throat, shortness of breath and wheezing. Pertinent negatives include no chest pain, chills, ear congestion, ear pain, fever, headaches, heartburn, hemoptysis or myalgias. He has tried a beta-agonist inhaler (benzonatate, throat spray, cough drops) for the symptoms. The treatment provided mild relief. His past medical history is significant for asthma, environmental allergies and pneumonia. There is no history of bronchiectasis, bronchitis, COPD or emphysema.       ROS:    Review of Systems   Constitutional:  Negative for chills, fever and malaise/fatigue.   HENT:  Positive for postnasal drip, rhinorrhea and sore throat. Negative for congestion and ear pain.    Respiratory:  Positive for cough, shortness of breath and wheezing. Negative for hemoptysis and sputum production.    Cardiovascular:  Negative for chest pain.   Gastrointestinal:  Negative for heartburn.   Musculoskeletal:  Negative for myalgias.   Neurological:  Negative for headaches.   Endo/Heme/Allergies:  Positive for environmental allergies.       Medications:      Current Outpatient Medications   Medication Sig    ketorolac (TORADOL) 30 MG/ML Solution INJECT 2 MLS INTRAMUSCULARLY FOR ONE DOSE    AJOVY 225 MG/1.5ML Solution Prefilled Syringe INJECT 225 MG UNDER THE SKIN EVERY MONTH AS DIRECTED    solifenacin (VESICARE) 10 MG tablet Take 10 mg by mouth every day.    metoprolol SR (TOPROL XL) 25 MG TABLET SR 24 HR Take 25 mg by mouth every day.    amLODIPine (NORVASC) 10 MG Tab Take 10 mg by mouth every day.    pantoprazole (PROTONIX) 40 MG Tablet  Delayed Response Take 40 mg by mouth every morning before breakfast. 30 minutes before breakfast    quetiapine (SEROQUEL) 400 MG tablet Take 800 mg by mouth at bedtime.    tadalafil (CIALIS) 5 MG tablet tadalafil 5 mg tablet   Take 1 tablet every day by oral route for 90 days.    Eluxadoline (VIBERZI) 100 MG Tab Take  by mouth.    escitalopram (LEXAPRO) 10 MG Tab Take 10 mg by mouth every day.    tamsulosin (FLOMAX) 0.4 MG capsule Take 0.4 mg by mouth 1/2 hour after breakfast.    meclizine (ANTIVERT) 25 MG Tab Take 1 Tab by mouth 3 times a day as needed.    albuterol (PROVENTIL) 2.5mg/3ml Nebu Soln solution for nebulization 2.5 mg by Nebulization route every four hours as needed for Shortness of Breath.    HYDROcodone-acetaminophen (NORCO) 7.5-325 MG tab Take 1 Tablet by mouth every four hours as needed. Indications: Moderate to Moderately Severe Pain    albuterol (VENTOLIN OR PROVENTIL) 108 (90 BASE) MCG/ACT Aero Soln inhalation aerosol Inhale 2 Puffs by mouth every four hours as needed for Shortness of Breath.    sumatriptan (IMITREX) 100 MG tablet Take 100 mg by mouth Once PRN for Migraine.    topiramate (TOPAMAX) 50 MG tablet Take 100 mg by mouth every bedtime.    zolpidem (AMBIEN) 10 MG Tab Take 10 mg by mouth at bedtime as needed for Sleep.    ondansetron (ZOFRAN) 4 MG TABS Take 1 Tab by mouth every four hours as needed for Nausea/Vomiting.    pramipexole (MIRAPEX) 0.25 MG Tab Take 0.25 mg by mouth every bedtime.    silver sulfADIAZINE (SILVADENE) 1 % Cream Apply 1 g topically every day. (Patient not taking: Reported on 5/24/2024)       Allergies:     No Known Allergies    Problem List:     Patient Active Problem List   Diagnosis    BPPV (benign paroxysmal positional vertigo)    Chronic pain    NATALIE (obstructive sleep apnea)    Allergic rhinitis    Fibromyalgia    Hypertrophy, nasal, turbinate    Primary erectile dysfunction    Lower urinary tract symptoms due to benign prostatic hyperplasia       Surgical  History:    Past Surgical History:   Procedure Laterality Date    TURBINOPLASTY Right 6/28/2016    Procedure: TURBINOPLASTY;  Surgeon: Zaki Yu M.D.;  Location: SURGERY SAME DAY [facility];  Service:     OTHER  2007    colonoscopy    OTHER  2006    Nasal - deviated septum       Past Social Hx:     Social History     Socioeconomic History    Marital status:    Tobacco Use    Smoking status: Never    Smokeless tobacco: Never   Vaping Use    Vaping status: Some Days   Substance and Sexual Activity    Alcohol use: No     Comment: occ    Drug use: No    Sexual activity: Yes     Partners: Female   Social History Narrative    ** Merged History Encounter **             Past Family Hx:      Family History   Problem Relation Age of Onset    Cancer Paternal Grandmother         breast       Problem list, medications, and allergies reviewed by myself today in Epic.     Objective:     Vitals:    05/24/24 1451   BP: 106/68   Pulse: 96   Resp: 16   Temp: 36.1 °C (97 °F)   SpO2: 98%       Physical Exam  Vitals reviewed.   Constitutional:       General: He is not in acute distress.     Appearance: Normal appearance. He is not ill-appearing, toxic-appearing or diaphoretic.   HENT:      Head: Normocephalic and atraumatic.      Right Ear: Tympanic membrane, ear canal and external ear normal.      Left Ear: Ear canal and external ear normal.      Nose: Congestion and rhinorrhea present.      Mouth/Throat:      Mouth: Mucous membranes are moist.      Pharynx: Oropharynx is clear. No oropharyngeal exudate or posterior oropharyngeal erythema.   Eyes:      Extraocular Movements: Extraocular movements intact.      Conjunctiva/sclera: Conjunctivae normal.      Pupils: Pupils are equal, round, and reactive to light.   Cardiovascular:      Rate and Rhythm: Normal rate and regular rhythm.      Pulses: Normal pulses.      Heart sounds: Normal heart sounds.   Pulmonary:      Effort: Pulmonary effort is normal.      Breath sounds:  Normal breath sounds.   Abdominal:      General: Abdomen is flat.      Palpations: Abdomen is soft.   Musculoskeletal:         General: Normal range of motion.      Cervical back: Normal range of motion and neck supple. No rigidity or tenderness.   Lymphadenopathy:      Cervical: No cervical adenopathy.   Skin:     General: Skin is warm and dry.   Neurological:      General: No focal deficit present.      Mental Status: He is alert and oriented to person, place, and time. Mental status is at baseline.   Psychiatric:         Mood and Affect: Mood normal.         Behavior: Behavior normal.         Thought Content: Thought content normal.         Judgment: Judgment normal.             Results from POCT:   Results for orders placed or performed in visit on 05/24/24   POCT CoV-2, Flu A/B, RSV by PCR   Result Value Ref Range    SARS-CoV-2 by PCR Negative Negative, Invalid    Influenza virus A RNA Negative Negative, Invalid    Influenza virus B, PCR Negative Negative, Invalid    RSV, PCR Negative Negative, Invalid       Assessment/Plan:     Diagnosis and associated orders:     1. Viral upper respiratory tract infection  - POCT CoV-2, Flu A/B, RSV by PCR  - Loratadine (CLARITIN) 10 MG Cap; Take 1 Tablet by mouth every day.  Dispense: 30 Capsule; Refill: 0    2. Acute cough    3. Mild intermittent asthma with acute exacerbation  - methylPREDNISolone (MEDROL DOSEPAK) 4 MG Tablet Therapy Pack; Follow schedule on package instructions.  Dispense: 21 Tablet; Refill: 0    Other orders  - ketorolac (TORADOL) 30 MG/ML Solution; INJECT 2 MLS INTRAMUSCULARLY FOR ONE DOSE          Comments/MDM:     POCT COVID, flu, RSV negative in clinic  Based on patient's symptoms, symptom duration, and physical exam findings, it was discussed with patient that the likely etiology of the illness is viral.                                                                                                                                                                                             The patient reports intermittent wheezing and dyspnea, consistent with an exacerbation of his asthma.  He reports that when he uses his albuterol inhaler, the symptoms resolved.  Prescription for Medrol Dosepak sent to patient's preferred pharmacy.  He declines refill of his albuterol as he has an adequate supply of this medication at home.    Recommend symptomatic care:  OTC second generation antihistamine daily (cetirizine, desloratadine, fexofenadine, levocetirizine, and loratadine) daily IN COMBINATION WITH:  OTC decongestant (Sudafed - Pseudoephedrine) unless contraindication in place, such as hypertension, CAD, narrow-angle glaucoma. Use with caution if the patient has a history of cardiac dysrhythmias, hyperthyroidism, DM, prostatic hypertrophy, and glaucoma should use with caution.  Intranasal fluticasone (Flonase) daily  Nasal saline rinses 2-3 times a day   May use short term nasal sprays, such as oxymetazoline (Afrin) to help relieve nasal discomfort, congestion, and/or pressure. Decongestant sprays should not be used longer than three consecutive days.   Nasal rinsing with saline nasal spray or salt water (e.g., neti pot) can help relieve nasal dryness.  Breathe Right nasal strips at night for nasal congestion  Ponaris nasal emmollient for nasal congestion, dryness, and inflammation (do not use with iodine sensitivity)  Cool mist humidification, chest rubs, warm tea with honey, increased fluid intake to thin secretions  Tylenol or ibuprofen as needed for fever control, body aches, and headaches.    Return precautions discussed with patient.  Patient verbalized understanding and is in agreement with this plan of care.          All questions answered. Patient verbalized understanding and is in agreement with this plan of care.     If symptoms are worsening or not improving in 3-5 days, follow-up with PCP or return to . Differential diagnosis, natural history, and  supportive care discussed. AVS handout given and reviewed with patient. Patient educated on red flags and when to seek treatment back in ED or UC.     I personally reviewed prior external notes and test results pertinent to today's visit.  I have independently reviewed and interpreted all diagnostics ordered during this urgent care visit.     This dictation has been created using voice recognition software. The accuracy of the dictation is limited by the abilities of the software. I expect there may be some errors of grammar and possibly content. I made every attempt to manually correct the errors within my dictation. However, errors related to voice recognition software may still exist and should be interpreted within the appropriate context.    This note was electronically signed by NORMA Jones

## 2024-08-22 RX ORDER — LORATADINE 10 MG/1
10 TABLET ORAL
Qty: 90 TABLET | OUTPATIENT
Start: 2024-08-22

## 2024-09-13 ENCOUNTER — HOSPITAL ENCOUNTER (OUTPATIENT)
Dept: LAB | Facility: MEDICAL CENTER | Age: 54
End: 2024-09-13
Attending: PSYCHIATRY & NEUROLOGY
Payer: MEDICAID

## 2024-09-13 LAB
APPEARANCE UR: CLEAR
BASOPHILS # BLD AUTO: 0.4 % (ref 0–1.8)
BASOPHILS # BLD: 0.02 K/UL (ref 0–0.12)
BILIRUB UR QL STRIP.AUTO: NEGATIVE
COLOR UR: YELLOW
EOSINOPHIL # BLD AUTO: 0.13 K/UL (ref 0–0.51)
EOSINOPHIL NFR BLD: 2.8 % (ref 0–6.9)
ERYTHROCYTE [DISTWIDTH] IN BLOOD BY AUTOMATED COUNT: 47.9 FL (ref 35.9–50)
EST. AVERAGE GLUCOSE BLD GHB EST-MCNC: 111 MG/DL
GLUCOSE UR STRIP.AUTO-MCNC: NEGATIVE MG/DL
HBA1C MFR BLD: 5.5 % (ref 4–5.6)
HCT VFR BLD AUTO: 40.1 % (ref 42–52)
HGB BLD-MCNC: 12.6 G/DL (ref 14–18)
IMM GRANULOCYTES # BLD AUTO: 0.01 K/UL (ref 0–0.11)
IMM GRANULOCYTES NFR BLD AUTO: 0.2 % (ref 0–0.9)
KETONES UR STRIP.AUTO-MCNC: NEGATIVE MG/DL
LEUKOCYTE ESTERASE UR QL STRIP.AUTO: NEGATIVE
LYMPHOCYTES # BLD AUTO: 1.75 K/UL (ref 1–4.8)
LYMPHOCYTES NFR BLD: 37.1 % (ref 22–41)
MCH RBC QN AUTO: 27.1 PG (ref 27–33)
MCHC RBC AUTO-ENTMCNC: 31.4 G/DL (ref 32.3–36.5)
MCV RBC AUTO: 86.2 FL (ref 81.4–97.8)
MICRO URNS: NORMAL
MONOCYTES # BLD AUTO: 0.43 K/UL (ref 0–0.85)
MONOCYTES NFR BLD AUTO: 9.1 % (ref 0–13.4)
NEUTROPHILS # BLD AUTO: 2.38 K/UL (ref 1.82–7.42)
NEUTROPHILS NFR BLD: 50.4 % (ref 44–72)
NITRITE UR QL STRIP.AUTO: NEGATIVE
NRBC # BLD AUTO: 0 K/UL
NRBC BLD-RTO: 0 /100 WBC (ref 0–0.2)
PH UR STRIP.AUTO: 5.5 [PH] (ref 5–8)
PLATELET # BLD AUTO: 275 K/UL (ref 164–446)
PMV BLD AUTO: 10 FL (ref 9–12.9)
PROT UR QL STRIP: NEGATIVE MG/DL
RBC # BLD AUTO: 4.65 M/UL (ref 4.7–6.1)
RBC UR QL AUTO: NEGATIVE
SP GR UR STRIP.AUTO: 1.02
UROBILINOGEN UR STRIP.AUTO-MCNC: 0.2 MG/DL
WBC # BLD AUTO: 4.7 K/UL (ref 4.8–10.8)

## 2024-09-13 PROCEDURE — G0480 DRUG TEST DEF 1-7 CLASSES: HCPCS

## 2024-09-13 PROCEDURE — 80053 COMPREHEN METABOLIC PANEL: CPT

## 2024-09-13 PROCEDURE — 84439 ASSAY OF FREE THYROXINE: CPT

## 2024-09-13 PROCEDURE — 36415 COLL VENOUS BLD VENIPUNCTURE: CPT

## 2024-09-13 PROCEDURE — 80061 LIPID PANEL: CPT

## 2024-09-13 PROCEDURE — 80307 DRUG TEST PRSMV CHEM ANLYZR: CPT

## 2024-09-13 PROCEDURE — 83036 HEMOGLOBIN GLYCOSYLATED A1C: CPT

## 2024-09-13 PROCEDURE — 81003 URINALYSIS AUTO W/O SCOPE: CPT | Mod: XU

## 2024-09-13 PROCEDURE — 85025 COMPLETE CBC W/AUTO DIFF WBC: CPT

## 2024-09-13 PROCEDURE — 84443 ASSAY THYROID STIM HORMONE: CPT

## 2024-09-14 LAB
ALBUMIN SERPL BCP-MCNC: 4.1 G/DL (ref 3.2–4.9)
ALBUMIN/GLOB SERPL: 1.5 G/DL
ALP SERPL-CCNC: 87 U/L (ref 30–99)
ALT SERPL-CCNC: 11 U/L (ref 2–50)
ANION GAP SERPL CALC-SCNC: 10 MMOL/L (ref 7–16)
AST SERPL-CCNC: 13 U/L (ref 12–45)
BILIRUB SERPL-MCNC: 0.3 MG/DL (ref 0.1–1.5)
BUN SERPL-MCNC: 19 MG/DL (ref 8–22)
CALCIUM ALBUM COR SERPL-MCNC: 8.8 MG/DL (ref 8.5–10.5)
CALCIUM SERPL-MCNC: 8.9 MG/DL (ref 8.5–10.5)
CHLORIDE SERPL-SCNC: 112 MMOL/L (ref 96–112)
CHOLEST SERPL-MCNC: 186 MG/DL (ref 100–199)
CO2 SERPL-SCNC: 21 MMOL/L (ref 20–33)
CREAT SERPL-MCNC: 1.57 MG/DL (ref 0.5–1.4)
FASTING STATUS PATIENT QL REPORTED: NORMAL
GFR SERPLBLD CREATININE-BSD FMLA CKD-EPI: 52 ML/MIN/1.73 M 2
GLOBULIN SER CALC-MCNC: 2.7 G/DL (ref 1.9–3.5)
GLUCOSE SERPL-MCNC: 86 MG/DL (ref 65–99)
HDLC SERPL-MCNC: 40 MG/DL
LDLC SERPL CALC-MCNC: 115 MG/DL
POTASSIUM SERPL-SCNC: 4 MMOL/L (ref 3.6–5.5)
PROT SERPL-MCNC: 6.8 G/DL (ref 6–8.2)
SODIUM SERPL-SCNC: 143 MMOL/L (ref 135–145)
T4 FREE SERPL-MCNC: 0.76 NG/DL (ref 0.93–1.7)
TRIGL SERPL-MCNC: 155 MG/DL (ref 0–149)
TSH SERPL-ACNC: 2.59 UIU/ML (ref 0.35–5.5)

## 2024-09-16 LAB
AMPHET CTO UR CFM-MCNC: NEGATIVE NG/ML
BARBITURATES CTO UR CFM-MCNC: NEGATIVE NG/ML
BENZODIAZ CTO UR CFM-MCNC: NEGATIVE NG/ML
CANNABINOIDS CTO UR CFM-MCNC: NEGATIVE NG/ML
COCAINE CTO UR CFM-MCNC: NEGATIVE NG/ML
CREAT UR-MCNC: 120 MG/DL (ref 20–400)
DRUG COMMENT 753798: NORMAL
METHADONE CTO UR CFM-MCNC: NEGATIVE NG/ML
OPIATES CTO UR CFM-MCNC: NORMAL NG/ML
PCP CTO UR CFM-MCNC: NEGATIVE NG/ML
PROPOXYPH CTO UR CFM-MCNC: NEGATIVE NG/ML

## 2024-09-18 LAB
6MAM UR CFM-MCNC: <10 NG/ML
CODEINE UR CFM-MCNC: <20 NG/ML
HYDROCODONE UR CFM-MCNC: 1287 NG/ML
HYDROMORPHONE UR CFM-MCNC: 23 NG/ML
MORPHINE UR CFM-MCNC: <20 NG/ML
NORHYDROCODONE UR CFM-MCNC: 2879 NG/ML
NOROXYCODONE UR CFM-MCNC: <20 NG/ML
OPIATES UR NOROXYM Q0836: <20 NG/ML
OXYCODONE UR CFM-MCNC: <20 NG/ML
OXYMORPHONE UR CFM-MCNC: <20 NG/ML

## 2024-11-04 ENCOUNTER — HOSPITAL ENCOUNTER (OUTPATIENT)
Dept: LAB | Facility: MEDICAL CENTER | Age: 54
End: 2024-11-04
Attending: PHYSICIAN ASSISTANT
Payer: MEDICAID

## 2024-11-04 LAB — PSA SERPL-MCNC: 0.89 NG/ML (ref 0–4)

## 2024-11-04 PROCEDURE — 36415 COLL VENOUS BLD VENIPUNCTURE: CPT

## 2024-11-04 PROCEDURE — 84153 ASSAY OF PSA TOTAL: CPT

## 2024-11-12 ENCOUNTER — OFFICE VISIT (OUTPATIENT)
Dept: SLEEP MEDICINE | Facility: MEDICAL CENTER | Age: 54
End: 2024-11-12
Attending: NURSE PRACTITIONER
Payer: MEDICAID

## 2024-11-12 VITALS
WEIGHT: 171 LBS | HEART RATE: 72 BPM | BODY MASS INDEX: 25.33 KG/M2 | HEIGHT: 69 IN | OXYGEN SATURATION: 97 % | SYSTOLIC BLOOD PRESSURE: 106 MMHG | DIASTOLIC BLOOD PRESSURE: 66 MMHG

## 2024-11-12 DIAGNOSIS — G47.33 OSA ON CPAP: ICD-10-CM

## 2024-11-12 PROCEDURE — 99214 OFFICE O/P EST MOD 30 MIN: CPT | Performed by: NURSE PRACTITIONER

## 2024-11-12 PROCEDURE — 3074F SYST BP LT 130 MM HG: CPT | Performed by: NURSE PRACTITIONER

## 2024-11-12 PROCEDURE — 99213 OFFICE O/P EST LOW 20 MIN: CPT | Performed by: NURSE PRACTITIONER

## 2024-11-12 PROCEDURE — 3078F DIAST BP <80 MM HG: CPT | Performed by: NURSE PRACTITIONER

## 2024-11-12 ASSESSMENT — PATIENT HEALTH QUESTIONNAIRE - PHQ9: CLINICAL INTERPRETATION OF PHQ2 SCORE: 0

## 2024-11-12 ASSESSMENT — FIBROSIS 4 INDEX: FIB4 SCORE: .7839294959021854189

## 2024-11-12 NOTE — PROGRESS NOTES
Chief Complaint   Patient presents with    Follow-Up     Last seen 11/10/23 DEB Joseph       HPI:  Singh Torres is a 54 y.o. year old male here today for follow-up on NATALIE annual visit.  Last seen by me on 11/10/2023. Patient has previously used a fullface mask but is currently using nasal pillows with heated tubing.  He does experience frequent nosebleeds.  He does have a history of migraines and is currently on Ajovy injections.  He does feel like there is often not enough air coming through the CPAP.  He will eventually rip the mask off during the midnight.  He averages 7 hours of sleep and does take Mirapex and Ambien to help fall asleep.  Also has restless leg therefore takes Mirapex for that.  Overall he notes improvement in sleep quality when using CPAP and denies any excessive daytime sleepiness, morning headaches, palpitations, concentration or memory problems.     Most recent 30-day compliance shows only 13 days of use with an average time of 4 hours and 18 minutes and a residual AHI of 1.1.    Has not been using CPAP lately due to increased condensation in tubing.  Patient has CPAP set higher than the level of head of bed.    ROS: As per HPI and otherwise negative if not stated.    Past Medical History:   Diagnosis Date    Arthritis     back, arm    ASTHMA     Backpain 01/01/1991    Bowel habit changes     diarrhea    Breath shortness     in the past    Dental disorder     rotting away    Elbow injury     Head injury 01/01/2004    Heart burn     Hemorrhagic disorder (HCC)     gums    Indigestion     Migraine with aura     NATALIE (obstructive sleep apnea) 01/30/2012    bipap    Pneumonia 01/01/2004    Restless leg syndrome     Sleep apnea     Snoring     Vertigo        Past Surgical History:   Procedure Laterality Date    TURBINOPLASTY Right 6/28/2016    Procedure: TURBINOPLASTY;  Surgeon: Zaki Yu M.D.;  Location: SURGERY SAME DAY [facility];  Service:     OTHER  2007    colonoscopy    OTHER  " 2006    Nasal - deviated septum       Family History   Problem Relation Age of Onset    Cancer Paternal Grandmother         breast       Allergies as of 11/12/2024    (No Known Allergies)        Vitals:  /66 (BP Location: Right arm, Patient Position: Sitting, BP Cuff Size: Adult)   Pulse 72   Ht 1.753 m (5' 9\")   Wt 77.6 kg (171 lb)   SpO2 97%     Current medications as of today   Current Outpatient Medications   Medication Sig Dispense Refill    ketorolac (TORADOL) 30 MG/ML Solution INJECT 2 MLS INTRAMUSCULARLY FOR ONE DOSE      Loratadine (CLARITIN) 10 MG Cap Take 1 Tablet by mouth every day. 30 Capsule 0    methylPREDNISolone (MEDROL DOSEPAK) 4 MG Tablet Therapy Pack Follow schedule on package instructions. 21 Tablet 0    benzonatate (TESSALON) 100 MG Cap Take 1 Capsule by mouth 3 times a day as needed for Cough. 60 Capsule 0    AJOVY 225 MG/1.5ML Solution Prefilled Syringe INJECT 225 MG UNDER THE SKIN EVERY MONTH AS DIRECTED      solifenacin (VESICARE) 10 MG tablet Take 10 mg by mouth every day.      metoprolol SR (TOPROL XL) 25 MG TABLET SR 24 HR Take 25 mg by mouth every day.      amLODIPine (NORVASC) 10 MG Tab Take 10 mg by mouth every day.      pantoprazole (PROTONIX) 40 MG Tablet Delayed Response Take 40 mg by mouth every morning before breakfast. 30 minutes before breakfast      quetiapine (SEROQUEL) 400 MG tablet Take 800 mg by mouth at bedtime.      tadalafil (CIALIS) 5 MG tablet tadalafil 5 mg tablet   Take 1 tablet every day by oral route for 90 days.      Eluxadoline (VIBERZI) 100 MG Tab Take  by mouth.      escitalopram (LEXAPRO) 10 MG Tab Take 10 mg by mouth every day.      tamsulosin (FLOMAX) 0.4 MG capsule Take 0.4 mg by mouth 1/2 hour after breakfast.      meclizine (ANTIVERT) 25 MG Tab Take 1 Tab by mouth 3 times a day as needed. 30 Tab 0    albuterol (PROVENTIL) 2.5mg/3ml Nebu Soln solution for nebulization 2.5 mg by Nebulization route every four hours as needed for Shortness of " Breath.      HYDROcodone-acetaminophen (NORCO) 7.5-325 MG tab Take 1 Tablet by mouth every four hours as needed. Indications: Moderate to Moderately Severe Pain      albuterol (VENTOLIN OR PROVENTIL) 108 (90 BASE) MCG/ACT Aero Soln inhalation aerosol Inhale 2 Puffs by mouth every four hours as needed for Shortness of Breath.      sumatriptan (IMITREX) 100 MG tablet Take 100 mg by mouth Once PRN for Migraine.      topiramate (TOPAMAX) 50 MG tablet Take 100 mg by mouth every bedtime.      zolpidem (AMBIEN) 10 MG Tab Take 10 mg by mouth at bedtime as needed for Sleep.      ondansetron (ZOFRAN) 4 MG TABS Take 1 Tab by mouth every four hours as needed for Nausea/Vomiting. 20 Tab 0    pramipexole (MIRAPEX) 0.25 MG Tab Take 0.25 mg by mouth every bedtime.      silver sulfADIAZINE (SILVADENE) 1 % Cream Apply 1 g topically every day. (Patient not taking: Reported on 11/12/2024) 25 g 0     No current facility-administered medications for this visit.         Physical Exam:   Gen:           Alert and oriented, No apparent distress. Mood and affect appropriate, normal interaction with examiner.  Eyes:          PERRL, EOM intact, sclere white, conjunctive moist.  Ears:          Not examined.   Hearing:     Grossly intact.  Nose:          Normal, no lesions or deformities.  Dentition:    Good dentition.  Oropharynx:   Tongue normal, posterior pharynx without erythema or exudate.  Neck:        Supple, trachea midline, no masses.  Respiratory Effort: No intercostal retractions or use of accessory muscles.   Lung Auscultation:      Clear to auscultation bilaterally; no rales, rhonchi or wheezing.  CV:            Regular rate and rhythm. No murmurs, rubs or gallops.  Abd:           Not examined.   Lymphadenopathy: Not examined.  Gait and Station: Normal.  Digits and Nails: No clubbing, cyanosis, petechiae, or nodes.   Cranial Nerves: II-XII grossly intact.  Skin:        No rashes, lesions or ulcers noted.               Ext:            No cyanosis or edema.      Assessment:  1. NATALIE on CPAP  DME Mask and Supplies        Plan:  Patient to come back in 3 months for compliance recheck.  CPAP is well controlling NATALIE, however limited use due to increased condensation inside tubing.  Patient advised on ways to remedy the problem and will return for compliance reassessment.  Order placed for mask and supplies which will be good for 1 year.    Please note that this dictation was created using voice recognition software. I have made every reasonable attempt to correct obvious errors, but it is possible there are errors of grammar and possibly content that I did not discover before finalizing the note.

## 2025-02-12 ENCOUNTER — APPOINTMENT (OUTPATIENT)
Dept: SLEEP MEDICINE | Facility: MEDICAL CENTER | Age: 55
End: 2025-02-12
Attending: NURSE PRACTITIONER
Payer: MEDICAID

## 2025-02-14 ENCOUNTER — OFFICE VISIT (OUTPATIENT)
Dept: SLEEP MEDICINE | Facility: MEDICAL CENTER | Age: 55
End: 2025-02-14
Attending: NURSE PRACTITIONER
Payer: MEDICAID

## 2025-02-14 VITALS
WEIGHT: 174 LBS | SYSTOLIC BLOOD PRESSURE: 120 MMHG | BODY MASS INDEX: 25.77 KG/M2 | HEIGHT: 69 IN | RESPIRATION RATE: 16 BRPM | DIASTOLIC BLOOD PRESSURE: 68 MMHG | HEART RATE: 80 BPM | OXYGEN SATURATION: 99 %

## 2025-02-14 DIAGNOSIS — G47.33 OSA ON CPAP: ICD-10-CM

## 2025-02-14 PROCEDURE — 3078F DIAST BP <80 MM HG: CPT | Performed by: NURSE PRACTITIONER

## 2025-02-14 PROCEDURE — 3074F SYST BP LT 130 MM HG: CPT | Performed by: NURSE PRACTITIONER

## 2025-02-14 PROCEDURE — 99214 OFFICE O/P EST MOD 30 MIN: CPT | Performed by: NURSE PRACTITIONER

## 2025-02-14 ASSESSMENT — FIBROSIS 4 INDEX: FIB4 SCORE: .7839294959021854189

## 2025-02-14 ASSESSMENT — PATIENT HEALTH QUESTIONNAIRE - PHQ9: CLINICAL INTERPRETATION OF PHQ2 SCORE: 0

## 2025-02-14 NOTE — PROGRESS NOTES
Chief Complaint   Patient presents with    Follow-Up     SLEEP - ESTABLISHED PT CHART PREP COMPLETED ON 02/12/2025     Last Office Visit 11/12/2024 with Jorge parish Compliance: No     DME: DME:  [facility] / ph [phone] / fax [phone]    Setup date: 05/07/2020    PAP/O2/OAT: AirSense 10 AutoSet  Min Pressure (cmH2O)  8.0  Max Pressure (cmH2O)  12.0  Response  Standard  EPR  Fulltime  EPR level (cmH2O)  2     Ramp enable  On  Ramp time (min)  5  Start pressure (cmH2O)  4.0     Wireless Data? YES ResMed        HPI:  Singh Torres is a 54 y.o. year old male here today for follow-up on NATALIE with compliance recheck.  Last seen 11/12/2024 by me.  At last visit patient was experiencing feelings of suffocation..       He is currently using nasal pillows.  Denies any difficulty with mask fit or pressures.  Has noted improvement and feelings of suffocation.  CPAP was adjusted slightly with minimal pressure.  Patient tolerated adjustment pretty well.  He averages 7 hours of sleep and does take Mirapex and Ambien to help fall asleep. Also has restless leg therefore takes Mirapex for that. Overall he notes improvement in sleep quality when using CPAP and denies any excessive daytime sleepiness, morning headaches, palpitations, concentration or memory problems.     Compliance shows 90% use with an average time of 8 hours and 18 minutes and a residual AHI of 7.0.  Also evidence of mask leak.  Discussed this with patient.  Advised patient to wear chinstrap, use mouth tape, or switch to fullface mask.  Patient will work on mask fitting and follow-up.    ROS: As per HPI and otherwise negative if not stated.    Past Medical History:   Diagnosis Date    Arthritis     back, arm    ASTHMA     Backpain 01/01/1991    Bowel habit changes     diarrhea    Breath shortness     in the past    Dental disorder     rotting away    Elbow injury     Head injury 01/01/2004    Heart burn     Hemorrhagic disorder (HCC)      "gums    Indigestion     Migraine with aura     NATALIE (obstructive sleep apnea) 01/30/2012    bipap    Pneumonia 01/01/2004    Restless leg syndrome     Sleep apnea     Snoring     Vertigo        Past Surgical History:   Procedure Laterality Date    TURBINOPLASTY Right 6/28/2016    Procedure: TURBINOPLASTY;  Surgeon: Zaki Yu M.D.;  Location: SURGERY SAME DAY [facility];  Service:     OTHER  2007    colonoscopy    OTHER  2006    Nasal - deviated septum       Family History   Problem Relation Age of Onset    Cancer Paternal Grandmother         breast       Allergies as of 02/14/2025    (No Known Allergies)        Vitals:  /68 (BP Location: Left arm, Patient Position: Sitting, BP Cuff Size: Adult)   Pulse 80   Resp 16   Ht 1.753 m (5' 9\")   Wt 78.9 kg (174 lb)   SpO2 99%     Current medications as of today   Current Outpatient Medications   Medication Sig Dispense Refill    ketorolac (TORADOL) 30 MG/ML Solution INJECT 2 MLS INTRAMUSCULARLY FOR ONE DOSE      Loratadine (CLARITIN) 10 MG Cap Take 1 Tablet by mouth every day. 30 Capsule 0    methylPREDNISolone (MEDROL DOSEPAK) 4 MG Tablet Therapy Pack Follow schedule on package instructions. 21 Tablet 0    benzonatate (TESSALON) 100 MG Cap Take 1 Capsule by mouth 3 times a day as needed for Cough. 60 Capsule 0    silver sulfADIAZINE (SILVADENE) 1 % Cream Apply 1 g topically every day. 25 g 0    AJOVY 225 MG/1.5ML Solution Prefilled Syringe INJECT 225 MG UNDER THE SKIN EVERY MONTH AS DIRECTED      solifenacin (VESICARE) 10 MG tablet Take 10 mg by mouth every day.      metoprolol SR (TOPROL XL) 25 MG TABLET SR 24 HR Take 25 mg by mouth every day.      amLODIPine (NORVASC) 10 MG Tab Take 10 mg by mouth every day.      pantoprazole (PROTONIX) 40 MG Tablet Delayed Response Take 40 mg by mouth every morning before breakfast. 30 minutes before breakfast      quetiapine (SEROQUEL) 400 MG tablet Take 800 mg by mouth at bedtime.      tadalafil (CIALIS) 5 MG tablet " tadalafil 5 mg tablet   Take 1 tablet every day by oral route for 90 days.      Eluxadoline (VIBERZI) 100 MG Tab Take  by mouth.      escitalopram (LEXAPRO) 10 MG Tab Take 10 mg by mouth every day.      tamsulosin (FLOMAX) 0.4 MG capsule Take 0.4 mg by mouth 1/2 hour after breakfast.      meclizine (ANTIVERT) 25 MG Tab Take 1 Tab by mouth 3 times a day as needed. 30 Tab 0    albuterol (PROVENTIL) 2.5mg/3ml Nebu Soln solution for nebulization 2.5 mg by Nebulization route every four hours as needed for Shortness of Breath.      HYDROcodone-acetaminophen (NORCO) 7.5-325 MG tab Take 1 Tablet by mouth every four hours as needed. Indications: Moderate to Moderately Severe Pain      albuterol (VENTOLIN OR PROVENTIL) 108 (90 BASE) MCG/ACT Aero Soln inhalation aerosol Inhale 2 Puffs by mouth every four hours as needed for Shortness of Breath.      sumatriptan (IMITREX) 100 MG tablet Take 100 mg by mouth Once PRN for Migraine.      topiramate (TOPAMAX) 50 MG tablet Take 100 mg by mouth every bedtime.      zolpidem (AMBIEN) 10 MG Tab Take 10 mg by mouth at bedtime as needed for Sleep.      ondansetron (ZOFRAN) 4 MG TABS Take 1 Tab by mouth every four hours as needed for Nausea/Vomiting. 20 Tab 0    pramipexole (MIRAPEX) 0.25 MG Tab Take 0.25 mg by mouth every bedtime.       No current facility-administered medications for this visit.         Physical Exam:   Gen:           Alert and oriented, No apparent distress. Mood and affect appropriate, normal interaction with examiner.  Eyes:          PERRL, EOM intact, sclere white, conjunctive moist.  Ears:          Not examined.   Hearing:     Grossly intact.  Nose:          Normal, no lesions or deformities.  Dentition:    Good dentition.  Oropharynx:   Tongue normal, posterior pharynx without erythema or exudate.  Neck:        Supple, trachea midline, no masses.  Respiratory Effort: No intercostal retractions or use of accessory muscles.   Lung Auscultation:      Clear to  auscultation bilaterally; no rales, rhonchi or wheezing.  CV:            Regular rate and rhythm. No murmurs, rubs or gallops.  Abd:           Not examined.   Lymphadenopathy: Not examined.  Gait and Station: Normal.  Digits and Nails: No clubbing, cyanosis, petechiae, or nodes.   Cranial Nerves: II-XII grossly intact.  Skin:        No rashes, lesions or ulcers noted.               Ext:           No cyanosis or edema.      Assessment:  1. NATALIE on CPAP          Plan:  Benefiting from PAP therapy.  Compliance shows evidence of mask leak.  Discussed options for remedy with patient.  Patient to return in 6 months for reassessment, but better tolerating PAP therapy and no more feelings of suffocation.  Evidence of mask leak.    Please note that this dictation was created using voice recognition software. I have made every reasonable attempt to correct obvious errors, but it is possible there are errors of grammar and possibly content that I did not discover before finalizing the note.

## 2025-04-22 ENCOUNTER — OFFICE VISIT (OUTPATIENT)
Dept: VASCULAR LAB | Facility: MEDICAL CENTER | Age: 55
End: 2025-04-22
Attending: INTERNAL MEDICINE
Payer: MEDICAID

## 2025-04-22 ENCOUNTER — RESEARCH ENCOUNTER (OUTPATIENT)
Dept: CARDIOLOGY | Facility: MEDICAL CENTER | Age: 55
End: 2025-04-22

## 2025-04-22 VITALS
DIASTOLIC BLOOD PRESSURE: 79 MMHG | WEIGHT: 168 LBS | SYSTOLIC BLOOD PRESSURE: 122 MMHG | BODY MASS INDEX: 24.81 KG/M2 | HEART RATE: 79 BPM

## 2025-04-22 DIAGNOSIS — I10 HYPERTENSION, UNSPECIFIED TYPE: ICD-10-CM

## 2025-04-22 ASSESSMENT — FIBROSIS 4 INDEX: FIB4 SCORE: 0.8

## 2025-04-22 NOTE — RESEARCH NOTE
"Name: Singh Torres   MRN: [id number]  Participant ID:  [id number]  : 1970  Visit Date/Time: 2025 9:30 AM      Study:    [id number] - [employer] [place] [facility]   Status Consented/Enrolled (2021)   Active Start Date 20   Coordinator Diana Jackson; Alecia Oden; Katarina Sullivan; Kerry Lundberg; Veronica, Lori BRODERICK; Corby Park     [id number] - [id number]   Status Consented/Enrolled (2025)   Active Start Date 25   Participant ID [id number]   Coordinator Letty Villarreal; Suzanne Pimentel     Screening/Consent note:    Participation in the THRIVE clinical trial was discussed with patient today. All aspects of the study purpose and procedures were explained by Dr. Bloch and myself.  Subject was given ample time to review the consent and all questions were answered to their satisfaction. Patient aware that the clinical trial is voluntary and they may withdraw consent at any time without affecting the level of care they receive.  Subject signed consent without coercion and undue influence and was given a copy of the signed consent. No study-related procedures took place prior to consenting and all assessments were conducted per protocol.    Patient will be part of the \"Controlled Arm\" of the study based on today's BP readings.    Patient took his BP meds this morning before office visit. Dr. Bloch instructions for tomorrow are to take only 12.5mg metoprolol for 7 days then stop. Amlodipine to be stopped immediately.     EKG done today signed by Dr. Bloch shows NSR.    Physical Exam conducted by Dr. Bloch, see his note.    Home blood pressure monitor and diary training performed. Patient took home machine #[id number] and diary along with instruction on use.       Vitals: Taken after resting >5 mins using study provided machine. First set of 3 SBP readings had greater than 20mmHg variability and per protocol were repeated.    Vitals:    25 1038 25 1039 " 04/22/25 1041   BP: 116/75 115/76 122/79   BP Location: Left arm     Patient Position: Sitting     BP Cuff Size: Adult     Pulse: 77 77 79   Weight: 76.2 kg (168 lb)             Med Hx w/ start dates:   Hypertension starting 2020  Closed head injury 2002  Back Pain 1991  Insomnia 2016  Fibromyalgia 2012  Asthma 2011  Vertigo 2011  NATALIE  2012  BPH- Benign prostatic hyperplasia 2016  Erectile dysfunction 2022  Acid Reflux 2012  Anxiety disorder 2009  Restless Leg syndrome 2010  Bipolar disorder 2010    Con Meds w/ start dates:  metoprolol 25mg qd 11/1/2022  amlodipine 10mg qd 2/13/2023-4/23/2025  odanesetron 4mg PRN 8/23/2014  pantoprazole 40mg qd 4/16/2023  eluxadoline 100mg BID 11/1/2022  topiramate 100mg qd 02/03/2016  zolpidem 10mg qd 02/03/2016  pramipexole 0.25mg qd 03/05/2011  sumatriptan 100mg PRN 03/02/2011  Ajovy 225mg sub-q monthly 05/08/2023  Proventil HFA 90mcg inhaler PRN 02/03/2016  albuterol sulfate nebulizer 2.5mg PRN 02/03/2016  meclizine 25mg PRN 01/30/2012  tamsulosin 0.4mg qd 11/01/2022  solifenacin 10mg qd 05/08/2023  tadalafil 5mg qd 05/08/2023  quetiapine 40mg qd 05/06/2023  escitalopram 20mg qd 11/1/2022  hydrocodone-acetaminophen 7.5-325mg PRN 02/03/2016   Ketorolac 60mg IM PRN 8/23/2014- pt states takes once every 3 months         Follow-up: 2 week safety phone call 5/6/25; Eligibility visit 1 5/19/25. Procedure tbs if qualifies.

## 2025-04-22 NOTE — PROGRESS NOTES
"RADIANCE SCREENING VISIT     Patient here for Radiance screening visit.    Patient with long history of hypertension.  Currently describes good adherence with metoprolol and amlodipine.     Past medical history consistent with hypertension and well-controlled mental health issues    Denies any known history of cardiovascular disease or stroke.  Denies kidney or liver disease.  No history of cancer.  No history of chronic autoimmune disease.     No obvious exclusion criteria are identified     Study protocol and IC document explained in detail.     After a thorough discussion of the risks and benefits, all questions were answered.     Pt would like to participate.  Pt signed informed consent.    Blood pressures in the office qualify for consideration in the \"controlled\" subgroup    Physical Exam  Vitals reviewed.   Constitutional:       General: He is not in acute distress.     Appearance: He is not diaphoretic.   HENT:      Head: Normocephalic and atraumatic.   Eyes:      General: No scleral icterus.     Conjunctiva/sclera: Conjunctivae normal.   Neck:      Vascular: No carotid bruit.   Cardiovascular:      Rate and Rhythm: Normal rate and regular rhythm.      Heart sounds: Normal heart sounds. No murmur heard.  Pulmonary:      Effort: Pulmonary effort is normal. No respiratory distress.      Breath sounds: Normal breath sounds. No wheezing or rales.   Musculoskeletal:      Right lower leg: No edema.      Left lower leg: No edema.   Skin:     Coloration: Skin is not pale.   Neurological:      General: No focal deficit present.      Mental Status: He is alert and oriented to person, place, and time.      Cranial Nerves: No cranial nerve deficit.      Coordination: Coordination normal.      Gait: Gait is intact. Gait normal.   Psychiatric:         Mood and Affect: Mood and affect normal.         Behavior: Behavior normal.       Pt was instructed by our study staff to stop amlodipine and taper off metoprolol according " to my instructions  I reminded the pt to call us if blood pressure lies outside of the parameters  given or if any symptoms whatsoever/ questions.  Patient verbalized understanding     Pt was instructed not to restart medication without first discussing it with us.     We will defer management of other medical conditions, aside from hypertension, to PCP.     Follow up as per protocol to continue to assess for qualification    Michael Bloch, MD  [facility]    Cc: LELIA Vinson

## 2025-04-30 ENCOUNTER — RESEARCH ENCOUNTER (OUTPATIENT)
Dept: VASCULAR LAB | Facility: MEDICAL CENTER | Age: 55
End: 2025-04-30
Payer: MEDICAID

## 2025-04-30 NOTE — RESEARCH NOTE
Spoke with Singh today. He confirmed last dose of amlodipine was 4/22/25 and today is last dose of metoprolol 12.5mg qd. He is taking BP readings per instruction without issues. Average BP remains 120-130's systolic and 90 diastolic.

## 2025-05-05 ENCOUNTER — RESEARCH ENCOUNTER (OUTPATIENT)
Dept: VASCULAR LAB | Facility: MEDICAL CENTER | Age: 55
End: 2025-05-05
Payer: MEDICAID

## 2025-05-05 NOTE — RESEARCH NOTE
Spoke with Singh today. He confirms his last dose of metoprolol was 4/30/25. We was reminded not to take his amlodipine or metoprolol until instructed to do so.   He reports compliance in filling out the study BP diary. Yesterday readings were 132/98 & 131/89. No others have met escape criteria.    He has had no AE/SHAWN's since our last visit.    Next visit is in office on 5/19/25.

## 2025-05-19 ENCOUNTER — RESEARCH ENCOUNTER (OUTPATIENT)
Dept: CARDIOLOGY | Facility: MEDICAL CENTER | Age: 55
End: 2025-05-19
Attending: INTERNAL MEDICINE
Payer: MEDICAID

## 2025-05-20 VITALS — SYSTOLIC BLOOD PRESSURE: 142 MMHG | HEART RATE: 100 BPM | DIASTOLIC BLOOD PRESSURE: 92 MMHG

## 2025-05-20 NOTE — RESEARCH NOTE
Name: Singh Torres   MRN: [id number]  Participant ID:  [id number]  : 1970  Visit Date/Time: 2025 12:28 PM      Study:    [id number] - [id number]   Status Consented/Enrolled (2025)   Active Start Date 25   Participant ID [id number]   Coordinator Letty Villarreal; Suzanne Pimentel; Letty Erazo; Fátima Olson   [facility] [id number]   [facility] [id number]    Michael J Bloch, M.D.

## 2025-05-20 NOTE — RESEARCH NOTE
Name: Singh Torres   MRN: [id number]  Participant ID:  [id number]  : 1970  Visit Date/Time: 2025 12:39 PM      Study:    [id number] - [id number]   Status Consented/Enrolled (2025)   Active Start Date 25   Participant ID [id number]   Coordinator Letty Villarreal; Suzanne Pimentel; Letty Erazo; Fátima Olson   IRB [id number]   [facility] [id number]    Michael J Bloch, M.D.       Singh came in today, 25 for Thrive Eligibility 1. Office BPs listed below. Home BPs  were downloaded into [employer].   ABPM was hooked up with instructions to not remove the monitor, no showering while it is on, to stop and rest when the warning inflation goes off, no strenuous activity, to not press any buttons or change settings. To call if the monitor doesn't go off. Instruction sheet was also provided to subject.    Addendum 2025: Subject returned monitor. Only 14 valid daytime readings. Upon talking with him, he did change settings and pushed buttons when it would error. He was willing to wear it again. I stressed the importance of not touching it, changing settings or doing anything to it. I told him to just ignore the monitor and to NOT remove it from the case for any reason. The monitor was initialized and put on him. It did do a reading in the office so the monitoring is working. I also reminded him again to not remove the monitor, no showering while it is on, to stop and rest when the warning inflation goes off, no strenuous activity, to not press any buttons or change settings. Reminded again to not remove the monitor from the case.  He will return it tomorrow 25 at noon.      Addendum 2025: ABPM was returned. It is valid. Daytime systolic was 138. QOL was done today.  read the the questions, subject verbally replied and  wrote the answer on the form. Form is uploaded into the [employer]. Urine for antihypertension  medications was collected and stored at -20. He states he has not taken any blood pressure medications. Local labs will be ordered. He will do them the 27th, before the CT. CT scheduled for 5/27/25. Renal angiogram tentatively scheduled for 6/25/25.     Vitals:    Vitals:    05/19/25 1123 05/19/25 1125 05/19/25 1126   BP: (!) 143/92 (!) 142/92 (!) 142/92   BP Location: Left arm Left arm Left arm   Patient Position: Sitting Sitting Sitting   Pulse: (!) 101 99 100

## 2025-05-23 DIAGNOSIS — I10 HYPERTENSION, UNSPECIFIED TYPE: Primary | ICD-10-CM

## 2025-05-27 ENCOUNTER — HOSPITAL ENCOUNTER (OUTPATIENT)
Dept: LAB | Facility: MEDICAL CENTER | Age: 55
End: 2025-05-27
Attending: INTERNAL MEDICINE
Payer: MEDICAID

## 2025-05-27 ENCOUNTER — HOSPITAL ENCOUNTER (OUTPATIENT)
Dept: RADIOLOGY | Facility: MEDICAL CENTER | Age: 55
End: 2025-05-27
Attending: INTERNAL MEDICINE
Payer: MEDICAID

## 2025-05-27 DIAGNOSIS — I10 HYPERTENSION, UNSPECIFIED TYPE: ICD-10-CM

## 2025-05-27 LAB
APTT PPP: 30.5 SEC (ref 24.7–36)
BASOPHILS # BLD AUTO: 0.5 % (ref 0–1.8)
BASOPHILS # BLD: 0.02 K/UL (ref 0–0.12)
EOSINOPHIL # BLD AUTO: 0.1 K/UL (ref 0–0.51)
EOSINOPHIL NFR BLD: 2.4 % (ref 0–6.9)
ERYTHROCYTE [DISTWIDTH] IN BLOOD BY AUTOMATED COUNT: 44.8 FL (ref 35.9–50)
EST. AVERAGE GLUCOSE BLD GHB EST-MCNC: 120 MG/DL
HBA1C MFR BLD: 5.8 % (ref 4–5.6)
HCT VFR BLD AUTO: 37.7 % (ref 42–52)
HGB BLD-MCNC: 12 G/DL (ref 14–18)
IMM GRANULOCYTES # BLD AUTO: 0.01 K/UL (ref 0–0.11)
IMM GRANULOCYTES NFR BLD AUTO: 0.2 % (ref 0–0.9)
INR PPP: 1.1 (ref 0.87–1.13)
LYMPHOCYTES # BLD AUTO: 1.17 K/UL (ref 1–4.8)
LYMPHOCYTES NFR BLD: 28.4 % (ref 22–41)
MCH RBC QN AUTO: 27.2 PG (ref 27–33)
MCHC RBC AUTO-ENTMCNC: 31.8 G/DL (ref 32.3–36.5)
MCV RBC AUTO: 85.5 FL (ref 81.4–97.8)
MONOCYTES # BLD AUTO: 0.37 K/UL (ref 0–0.85)
MONOCYTES NFR BLD AUTO: 9 % (ref 0–13.4)
NEUTROPHILS # BLD AUTO: 2.45 K/UL (ref 1.82–7.42)
NEUTROPHILS NFR BLD: 59.5 % (ref 44–72)
NRBC # BLD AUTO: 0 K/UL
NRBC BLD-RTO: 0 /100 WBC (ref 0–0.2)
PLATELET # BLD AUTO: 306 K/UL (ref 164–446)
PMV BLD AUTO: 9.6 FL (ref 9–12.9)
PROTHROMBIN TIME: 14.2 SEC (ref 12–14.6)
RBC # BLD AUTO: 4.41 M/UL (ref 4.7–6.1)
WBC # BLD AUTO: 4.1 K/UL (ref 4.8–10.8)

## 2025-05-27 PROCEDURE — 85025 COMPLETE CBC W/AUTO DIFF WBC: CPT

## 2025-05-27 PROCEDURE — 82977 ASSAY OF GGT: CPT

## 2025-05-27 PROCEDURE — 82570 ASSAY OF URINE CREATININE: CPT

## 2025-05-27 PROCEDURE — 700117 HCHG RX CONTRAST REV CODE 255: Mod: UD | Performed by: INTERNAL MEDICINE

## 2025-05-27 PROCEDURE — 83615 LACTATE (LD) (LDH) ENZYME: CPT

## 2025-05-27 PROCEDURE — 85610 PROTHROMBIN TIME: CPT

## 2025-05-27 PROCEDURE — 36415 COLL VENOUS BLD VENIPUNCTURE: CPT

## 2025-05-27 PROCEDURE — 74175 CTA ABDOMEN W/CONTRAST: CPT

## 2025-05-27 PROCEDURE — 83036 HEMOGLOBIN GLYCOSYLATED A1C: CPT

## 2025-05-27 PROCEDURE — 85730 THROMBOPLASTIN TIME PARTIAL: CPT

## 2025-05-27 PROCEDURE — 80061 LIPID PANEL: CPT

## 2025-05-27 PROCEDURE — 82043 UR ALBUMIN QUANTITATIVE: CPT

## 2025-05-27 PROCEDURE — 80053 COMPREHEN METABOLIC PANEL: CPT

## 2025-05-27 RX ADMIN — IOHEXOL 100 ML: 350 INJECTION, SOLUTION INTRAVENOUS at 10:57

## 2025-05-28 LAB
ALBUMIN SERPL BCP-MCNC: 4.2 G/DL (ref 3.2–4.9)
ALBUMIN/GLOB SERPL: 1.7 G/DL
ALP SERPL-CCNC: 76 U/L (ref 30–99)
ALT SERPL-CCNC: 15 U/L (ref 2–50)
ANION GAP SERPL CALC-SCNC: 12 MMOL/L (ref 7–16)
AST SERPL-CCNC: 19 U/L (ref 12–45)
BILIRUB SERPL-MCNC: 0.3 MG/DL (ref 0.1–1.5)
BUN SERPL-MCNC: 20 MG/DL (ref 8–22)
CALCIUM ALBUM COR SERPL-MCNC: 8.7 MG/DL (ref 8.5–10.5)
CALCIUM SERPL-MCNC: 8.9 MG/DL (ref 8.5–10.5)
CHLORIDE SERPL-SCNC: 107 MMOL/L (ref 96–112)
CHOLEST SERPL-MCNC: 183 MG/DL (ref 100–199)
CO2 SERPL-SCNC: 19 MMOL/L (ref 20–33)
CREAT SERPL-MCNC: 1.37 MG/DL (ref 0.5–1.4)
CREAT UR-MCNC: 149 MG/DL
FASTING STATUS PATIENT QL REPORTED: NORMAL
GFR SERPLBLD CREATININE-BSD FMLA CKD-EPI: 60 ML/MIN/1.73 M 2
GGT SERPL-CCNC: 45 U/L (ref 7–51)
GLOBULIN SER CALC-MCNC: 2.5 G/DL (ref 1.9–3.5)
GLUCOSE SERPL-MCNC: 116 MG/DL (ref 65–99)
HDLC SERPL-MCNC: 49 MG/DL
LDH SERPL L TO P-CCNC: 170 U/L (ref 107–266)
LDLC SERPL CALC-MCNC: 116 MG/DL
MICROALBUMIN UR-MCNC: <1.2 MG/DL
MICROALBUMIN/CREAT UR: NORMAL MG/G (ref 0–30)
POTASSIUM SERPL-SCNC: 3.9 MMOL/L (ref 3.6–5.5)
PROT SERPL-MCNC: 6.7 G/DL (ref 6–8.2)
SODIUM SERPL-SCNC: 138 MMOL/L (ref 135–145)
TRIGL SERPL-MCNC: 91 MG/DL (ref 0–149)

## 2025-06-12 ENCOUNTER — APPOINTMENT (OUTPATIENT)
Dept: ADMISSIONS | Facility: MEDICAL CENTER | Age: 55
End: 2025-06-12
Attending: INTERNAL MEDICINE
Payer: MEDICAID

## 2025-06-12 ENCOUNTER — TELEPHONE (OUTPATIENT)
Dept: CARDIOLOGY | Facility: MEDICAL CENTER | Age: 55
End: 2025-06-12
Payer: MEDICAID

## 2025-06-12 NOTE — TELEPHONE ENCOUNTER
Patient is scheduled on 6-25-25 for a Renal angiogram with Dr. Luther at [facility]. Patient was told to hold Cialis for 72 hrs prior and to check in at 9:00 AM for an 11:00 procedure. Updated H&P to be done on admit by NP. Pre admit to call patient. Clinical Research patient.

## 2025-06-12 NOTE — TELEPHONE ENCOUNTER
----- Message from Research Coordinator Letty GALLARDO, Clinical Research Coordinator sent at 6/11/2025 10:28 AM PDT -----  Regarding: THRIVE Renal Denervation  Rubén Salazar,    This patient has passed all screening requirements. Please get him officially scheduled for the Renal Angiogram. His hold spot was 6/25/25 at 11am.    -Letty

## 2025-06-18 ENCOUNTER — PRE-ADMISSION TESTING (OUTPATIENT)
Dept: ADMISSIONS | Facility: MEDICAL CENTER | Age: 55
End: 2025-06-18
Attending: INTERNAL MEDICINE

## 2025-06-18 NOTE — OR NURSING
RN pre admit tele appointment complete for procedure on 6/25/25.  Medication and fasting instructions given per cardiology.  Patient stated understanding of all instructions and had no further questions.  Patient lives out of town and will need testing done day of procedure.

## 2025-06-25 ENCOUNTER — APPOINTMENT (OUTPATIENT)
Dept: CARDIOLOGY | Facility: MEDICAL CENTER | Age: 55
End: 2025-06-25
Attending: INTERNAL MEDICINE

## 2025-06-25 ENCOUNTER — RESEARCH ENCOUNTER (OUTPATIENT)
Dept: CARDIOLOGY | Facility: MEDICAL CENTER | Age: 55
End: 2025-06-25

## 2025-06-25 ENCOUNTER — HOSPITAL ENCOUNTER (OUTPATIENT)
Facility: MEDICAL CENTER | Age: 55
End: 2025-06-25
Attending: INTERNAL MEDICINE | Admitting: INTERNAL MEDICINE
Payer: MEDICAID

## 2025-06-25 VITALS
RESPIRATION RATE: 17 BRPM | HEIGHT: 69 IN | OXYGEN SATURATION: 98 % | WEIGHT: 163.14 LBS | SYSTOLIC BLOOD PRESSURE: 127 MMHG | BODY MASS INDEX: 24.16 KG/M2 | HEART RATE: 83 BPM | DIASTOLIC BLOOD PRESSURE: 71 MMHG | TEMPERATURE: 97.4 F

## 2025-06-25 DIAGNOSIS — I10 HYPERTENSION, UNSPECIFIED TYPE: ICD-10-CM

## 2025-06-25 LAB
ACT BLD: 233 SEC (ref 74–137)
ACT BLD: 291 SEC (ref 74–137)
ACT BLD: 354 SEC (ref 74–137)
ALBUMIN SERPL BCP-MCNC: 4.3 G/DL (ref 3.2–4.9)
ALBUMIN/GLOB SERPL: 1.7 G/DL
ALP SERPL-CCNC: 86 U/L (ref 30–99)
ALT SERPL-CCNC: 8 U/L (ref 2–50)
ANION GAP SERPL CALC-SCNC: 12 MMOL/L (ref 7–16)
APTT PPP: 31.1 SEC (ref 24.7–36)
AST SERPL-CCNC: 17 U/L (ref 12–45)
BILIRUB SERPL-MCNC: 0.4 MG/DL (ref 0.1–1.5)
BUN SERPL-MCNC: 17 MG/DL (ref 8–22)
CALCIUM ALBUM COR SERPL-MCNC: 8.5 MG/DL (ref 8.5–10.5)
CALCIUM SERPL-MCNC: 8.7 MG/DL (ref 8.5–10.5)
CHLORIDE SERPL-SCNC: 110 MMOL/L (ref 96–112)
CO2 SERPL-SCNC: 20 MMOL/L (ref 20–33)
CREAT SERPL-MCNC: 1.13 MG/DL (ref 0.5–1.4)
EKG IMPRESSION: NORMAL
ERYTHROCYTE [DISTWIDTH] IN BLOOD BY AUTOMATED COUNT: 44.9 FL (ref 35.9–50)
GFR SERPLBLD CREATININE-BSD FMLA CKD-EPI: 76 ML/MIN/1.73 M 2
GLOBULIN SER CALC-MCNC: 2.6 G/DL (ref 1.9–3.5)
GLUCOSE SERPL-MCNC: 93 MG/DL (ref 65–99)
HCT VFR BLD AUTO: 39.5 % (ref 42–52)
HGB BLD-MCNC: 12.9 G/DL (ref 14–18)
INR PPP: 1.05 (ref 0.87–1.13)
MCH RBC QN AUTO: 27.5 PG (ref 27–33)
MCHC RBC AUTO-ENTMCNC: 32.7 G/DL (ref 32.3–36.5)
MCV RBC AUTO: 84.2 FL (ref 81.4–97.8)
PLATELET # BLD AUTO: 243 K/UL (ref 164–446)
PMV BLD AUTO: 9.9 FL (ref 9–12.9)
POTASSIUM SERPL-SCNC: 3.8 MMOL/L (ref 3.6–5.5)
PROT SERPL-MCNC: 6.9 G/DL (ref 6–8.2)
PROTHROMBIN TIME: 13.7 SEC (ref 12–14.6)
RBC # BLD AUTO: 4.69 M/UL (ref 4.7–6.1)
SODIUM SERPL-SCNC: 142 MMOL/L (ref 135–145)
WBC # BLD AUTO: 4.4 K/UL (ref 4.8–10.8)

## 2025-06-25 PROCEDURE — 85730 THROMBOPLASTIN TIME PARTIAL: CPT

## 2025-06-25 PROCEDURE — 93010 ELECTROCARDIOGRAM REPORT: CPT | Performed by: INTERNAL MEDICINE

## 2025-06-25 PROCEDURE — 160002 HCHG RECOVERY MINUTES (STAT)

## 2025-06-25 PROCEDURE — 85347 COAGULATION TIME ACTIVATED: CPT | Mod: 91

## 2025-06-25 PROCEDURE — 85610 PROTHROMBIN TIME: CPT

## 2025-06-25 PROCEDURE — 93005 ELECTROCARDIOGRAM TRACING: CPT | Mod: TC | Performed by: INTERNAL MEDICINE

## 2025-06-25 PROCEDURE — 700102 HCHG RX REV CODE 250 W/ 637 OVERRIDE(OP): Performed by: INTERNAL MEDICINE

## 2025-06-25 PROCEDURE — 700111 HCHG RX REV CODE 636 W/ 250 OVERRIDE (IP): Mod: JZ | Performed by: NURSE PRACTITIONER

## 2025-06-25 PROCEDURE — 160015 HCHG STAT PREOP MINUTES

## 2025-06-25 PROCEDURE — 700117 HCHG RX CONTRAST REV CODE 255: Performed by: INTERNAL MEDICINE

## 2025-06-25 PROCEDURE — 99153 MOD SED SAME PHYS/QHP EA: CPT

## 2025-06-25 PROCEDURE — 700101 HCHG RX REV CODE 250

## 2025-06-25 PROCEDURE — 93005 ELECTROCARDIOGRAM TRACING: CPT | Mod: TC | Performed by: NURSE PRACTITIONER

## 2025-06-25 PROCEDURE — 0339T TRNSCTH RENAL SYMP DENRV BIL: CPT | Performed by: INTERNAL MEDICINE

## 2025-06-25 PROCEDURE — 85027 COMPLETE CBC AUTOMATED: CPT

## 2025-06-25 PROCEDURE — 700111 HCHG RX REV CODE 636 W/ 250 OVERRIDE (IP): Mod: JZ

## 2025-06-25 PROCEDURE — 700111 HCHG RX REV CODE 636 W/ 250 OVERRIDE (IP): Mod: JZ | Performed by: INTERNAL MEDICINE

## 2025-06-25 PROCEDURE — 160193 HCHG PACU STANDARD - 1ST 60 MINS

## 2025-06-25 PROCEDURE — A9270 NON-COVERED ITEM OR SERVICE: HCPCS | Performed by: INTERNAL MEDICINE

## 2025-06-25 PROCEDURE — 80053 COMPREHEN METABOLIC PANEL: CPT

## 2025-06-25 PROCEDURE — 99152 MOD SED SAME PHYS/QHP 5/>YRS: CPT | Performed by: INTERNAL MEDICINE

## 2025-06-25 PROCEDURE — 160046 HCHG PACU - 1ST 60 MINS PHASE II

## 2025-06-25 PROCEDURE — 160194 HCHG PACU STANDARD - EA ADDL 30 MINS

## 2025-06-25 RX ORDER — HEPARIN SODIUM 1000 [USP'U]/ML
INJECTION, SOLUTION INTRAVENOUS; SUBCUTANEOUS
Status: COMPLETED
Start: 2025-06-25 | End: 2025-06-25

## 2025-06-25 RX ORDER — MORPHINE SULFATE 4 MG/ML
2 INJECTION INTRAVENOUS ONCE
Status: COMPLETED | OUTPATIENT
Start: 2025-06-25 | End: 2025-06-25

## 2025-06-25 RX ORDER — ACETAMINOPHEN 325 MG/1
650 TABLET ORAL EVERY 6 HOURS PRN
Status: DISCONTINUED | OUTPATIENT
Start: 2025-06-25 | End: 2025-06-25 | Stop reason: HOSPADM

## 2025-06-25 RX ORDER — MIDAZOLAM HYDROCHLORIDE 1 MG/ML
INJECTION INTRAMUSCULAR; INTRAVENOUS
Status: COMPLETED
Start: 2025-06-25 | End: 2025-06-25

## 2025-06-25 RX ORDER — LIDOCAINE HYDROCHLORIDE 20 MG/ML
INJECTION, SOLUTION INFILTRATION; PERINEURAL
Status: COMPLETED
Start: 2025-06-25 | End: 2025-06-25

## 2025-06-25 RX ORDER — ASPIRIN 325 MG
325 TABLET ORAL ONCE
Status: COMPLETED | OUTPATIENT
Start: 2025-06-25 | End: 2025-06-25

## 2025-06-25 RX ORDER — HEPARIN SODIUM 200 [USP'U]/100ML
INJECTION, SOLUTION INTRAVENOUS
Status: COMPLETED
Start: 2025-06-25 | End: 2025-06-25

## 2025-06-25 RX ORDER — SODIUM CHLORIDE 9 MG/ML
INJECTION, SOLUTION INTRAVENOUS CONTINUOUS
Status: DISCONTINUED | OUTPATIENT
Start: 2025-06-25 | End: 2025-06-25 | Stop reason: HOSPADM

## 2025-06-25 RX ORDER — ONDANSETRON 2 MG/ML
4 INJECTION INTRAMUSCULAR; INTRAVENOUS EVERY 4 HOURS PRN
Status: DISCONTINUED | OUTPATIENT
Start: 2025-06-25 | End: 2025-06-25 | Stop reason: HOSPADM

## 2025-06-25 RX ORDER — ASPIRIN 81 MG/1
TABLET, CHEWABLE ORAL
Status: COMPLETED
Start: 2025-06-25 | End: 2025-06-25

## 2025-06-25 RX ORDER — ASPIRIN 81 MG/1
81 TABLET ORAL DAILY
Status: DISCONTINUED | OUTPATIENT
Start: 2025-06-26 | End: 2025-06-25 | Stop reason: HOSPADM

## 2025-06-25 RX ORDER — ONDANSETRON 2 MG/ML
4 INJECTION INTRAMUSCULAR; INTRAVENOUS ONCE
Status: SHIPPED | OUTPATIENT
Start: 2025-06-25 | End: 2025-06-28

## 2025-06-25 RX ADMIN — MORPHINE SULFATE 2 MG: 4 INJECTION, SOLUTION INTRAMUSCULAR; INTRAVENOUS at 17:10

## 2025-06-25 RX ADMIN — LIDOCAINE HYDROCHLORIDE: 20 INJECTION, SOLUTION INFILTRATION; PERINEURAL at 11:12

## 2025-06-25 RX ADMIN — ASPIRIN 325 MG: 325 TABLET ORAL at 10:49

## 2025-06-25 RX ADMIN — MIDAZOLAM HYDROCHLORIDE 1 MG: 1 INJECTION, SOLUTION INTRAMUSCULAR; INTRAVENOUS at 12:32

## 2025-06-25 RX ADMIN — FENTANYL CITRATE 100 MCG: 50 INJECTION, SOLUTION INTRAMUSCULAR; INTRAVENOUS at 12:17

## 2025-06-25 RX ADMIN — HEPARIN SODIUM 2000 UNITS: 200 INJECTION, SOLUTION INTRAVENOUS at 11:13

## 2025-06-25 RX ADMIN — ONDANSETRON 4 MG: 2 INJECTION INTRAMUSCULAR; INTRAVENOUS at 13:54

## 2025-06-25 RX ADMIN — HEPARIN SODIUM: 1000 INJECTION, SOLUTION INTRAVENOUS; SUBCUTANEOUS at 11:55

## 2025-06-25 RX ADMIN — MIDAZOLAM HYDROCHLORIDE 2 MG: 1 INJECTION, SOLUTION INTRAMUSCULAR; INTRAVENOUS at 11:18

## 2025-06-25 RX ADMIN — FENTANYL CITRATE 100 MCG: 50 INJECTION, SOLUTION INTRAMUSCULAR; INTRAVENOUS at 11:53

## 2025-06-25 RX ADMIN — IOHEXOL 95 ML: 350 INJECTION, SOLUTION INTRAVENOUS at 12:34

## 2025-06-25 RX ADMIN — MIDAZOLAM HYDROCHLORIDE 2 MG: 1 INJECTION, SOLUTION INTRAMUSCULAR; INTRAVENOUS at 11:55

## 2025-06-25 RX ADMIN — NITROGLYCERIN 10 ML: 20 INJECTION INTRAVENOUS at 12:07

## 2025-06-25 RX ADMIN — ACETAMINOPHEN 650 MG: 325 TABLET ORAL at 17:10

## 2025-06-25 RX ADMIN — MIDAZOLAM HYDROCHLORIDE 2 MG: 1 INJECTION, SOLUTION INTRAMUSCULAR; INTRAVENOUS at 12:17

## 2025-06-25 RX ADMIN — HEPARIN SODIUM: 1000 INJECTION, SOLUTION INTRAVENOUS; SUBCUTANEOUS at 11:12

## 2025-06-25 RX ADMIN — FENTANYL CITRATE 100 MCG: 50 INJECTION, SOLUTION INTRAMUSCULAR; INTRAVENOUS at 11:18

## 2025-06-25 RX ADMIN — MIDAZOLAM HYDROCHLORIDE 2 MG: 1 INJECTION, SOLUTION INTRAMUSCULAR; INTRAVENOUS at 11:53

## 2025-06-25 RX ADMIN — FENTANYL CITRATE 50 MCG: 50 INJECTION, SOLUTION INTRAMUSCULAR; INTRAVENOUS at 12:32

## 2025-06-25 ASSESSMENT — ENCOUNTER SYMPTOMS
FOCAL WEAKNESS: 0
ORTHOPNEA: 0
DIZZINESS: 0
HEADACHES: 0
SHORTNESS OF BREATH: 0
BLURRED VISION: 0
WHEEZING: 0
PALPITATIONS: 0
WEAKNESS: 0
DOUBLE VISION: 0
FALLS: 0
LOSS OF CONSCIOUSNESS: 0
COUGH: 0

## 2025-06-25 ASSESSMENT — FIBROSIS 4 INDEX: FIB4 SCORE: 0.9

## 2025-06-25 ASSESSMENT — PAIN DESCRIPTION - PAIN TYPE: TYPE: ACUTE PAIN

## 2025-06-25 NOTE — H&P
HPI:  [facility] for renal angiography and subsequent potential enrollment into the RDN trial with intraprocedural randomization to sham or interventional treatment.  Risk and benefits were discussed in extensive detail both under the auspices of clinical trial enrollment and the study coordinators as well as personally today.    Past Medical History[1]    Social History     Socioeconomic History    Marital status:      Spouse name: Not on file    Number of children: Not on file    Years of education: Not on file    Highest education level: Not on file   Occupational History    Not on file   Tobacco Use    Smoking status: Never     Passive exposure: Past    Smokeless tobacco: Never   Vaping Use    Vaping status: Some Days    Substances: Nicotine   Substance and Sexual Activity    Alcohol use: Not Currently     Comment: I rarely drink. Maybe if im at [place] or [employer] entertainment    Drug use: No    Sexual activity: Yes     Partners: Female   Other Topics Concern    Not on file   Social History Narrative    ** Merged History Encounter **          Social Drivers of Health     Financial Resource Strain: Not on file   Food Insecurity: Not on file   Transportation Needs: Not on file   Physical Activity: Not on file   Stress: Not on file   Social Connections: Not on file   Intimate Partner Violence: Not on file   Housing Stability: Not on file       Medications Ordered Prior to Encounter[2]    Current Facility-Administered Medications   Medication Dose Frequency Provider Last Rate Last Admin    NS infusion   Continuous Shelton Luther M.D. 125 mL/hr at 06/25/25 1341 Rate Verify at 06/25/25 1341    acetaminophen (Tylenol) tablet 650 mg  650 mg Q6HRS PRN Shelton Luther M.D.        [START ON 6/26/2025] aspirin EC tablet 81 mg  81 mg DAILY Shelton Luther M.D.        ondansetron (Zofran) syringe/vial injection 4 mg  4 mg Q4HRS PRN Shelton Luther M.D.       Last  reviewed on 6/25/2025  8:46 AM by Pb Villanueva R.N.     Patient has no known allergies.    Family History   Problem Relation Age of Onset    Cancer Paternal Grandmother         Breast Cancer & Cataracts    Breast Cancer Maternal Grandmother     Hypertension Maternal Grandmother     Stroke Maternal Grandmother     Autism Daughter         She is fine now she is  and has a child living in [place]       Physical Exam   Vital signs 8:45 AM: N/A BP: 122/84, weight 74 kg, height 175.3 cm, pulse 77 bpm, temperature 36.1 °C, SpO2 98% on room air.    Constitutional: Appears well-developed.   HENT: Normocephalic and atraumatic. No scleral icterus.   Neck: No JVD present.   Cardiovascular: Normal rate.   Pulmonary/Chest: Normal chest rise  Musculoskeletal:  Pulses present. No atrophy. Strength normal.  Extremities: Exhibits no edema. No clubbing or cyanosis.   Skin: Skin is warm and dry.   Neuro: Non-focal, CN 2-12 intact grossly    Imaging reviewed    Impressions:  1.  Planned abdominal aortography and renal angiography for hypertension in context of the THRIVE RDN Clinical trial.    Recommendations:  Proceed with planned procedure and subsequent randomization if appropriate to sham or procedural renal denervation.    I have discussed the risks and benefits of cardiac catheterization, renal angiography and RDN as well as the procedure itself, rationale and appropriateness in detail with the patient today. Complications including but not limited to death, stroke, MI, urgent bypass surgery, contrast nephropathy, vascular complications, bleeding and infection were explained to the patient. The potential outcomes associated with the procedure were also discussed at length. The patient agrees to proceed.         [1]   Past Medical History:  Diagnosis Date    Arthritis     back, arm    ASTHMA     Backpain 01/01/1991    Bowel habit changes 2015    diarrhea    Breath shortness 01/2015    in the past    Dental disorder  2015    rotting away - partial denture    Elbow injury     Fibromyalgia     Head injury 01/01/2004    Heart burn 2017    Hemorrhagic disorder (HCC)     gums    Hypertension 7/08/2020    185/109    Indigestion 2016    Migraine with aura     NATALIE (obstructive sleep apnea) 01/30/2012    cpap    Pneumonia 01/01/2004    Psychiatric problem 2002    bipolar, depression    Restless leg syndrome     Sleep apnea 2015    Snoring 2010    Vertigo    [2]   No current facility-administered medications on file prior to encounter.     Current Outpatient Medications on File Prior to Encounter   Medication Sig Dispense Refill    solifenacin (VESICARE) 10 MG tablet Take 10 mg by mouth every day.      pantoprazole (PROTONIX) 40 MG Tablet Delayed Response Take 40 mg by mouth every morning before breakfast. 30 minutes before breakfast      quetiapine (SEROQUEL) 400 MG tablet Take 800 mg by mouth at bedtime.      Eluxadoline (VIBERZI) 100 MG Tab Take  by mouth 2 times a day.      escitalopram (LEXAPRO) 10 MG Tab Take 10 mg by mouth every evening.      tamsulosin (FLOMAX) 0.4 MG capsule Take 0.4 mg by mouth every evening.      meclizine (ANTIVERT) 25 MG Tab Take 1 Tab by mouth 3 times a day as needed. 30 Tab 0    topiramate (TOPAMAX) 50 MG tablet Take 100 mg by mouth every bedtime.      zolpidem (AMBIEN) 10 MG Tab Take 10 mg by mouth at bedtime as needed for Sleep.      ondansetron (ZOFRAN) 4 MG TABS Take 1 Tab by mouth every four hours as needed for Nausea/Vomiting. 20 Tab 0    pramipexole (MIRAPEX) 0.25 MG Tab Take 0.25 mg by mouth every bedtime.      ketorolac (TORADOL) 30 MG/ML Solution INJECT 2 MLS INTRAMUSCULARLY FOR ONE DOSE      AJOVY 225 MG/1.5ML Solution Prefilled Syringe INJECT 225 MG UNDER THE SKIN EVERY MONTH AS DIRECTED      tadalafil (CIALIS) 5 MG tablet tadalafil 5 mg tablet   Take 1 tablet every day by oral route for 90 days.      albuterol (PROVENTIL) 2.5mg/3ml Nebu Soln solution for nebulization 2.5 mg by Nebulization  route every four hours as needed for Shortness of Breath.      HYDROcodone-acetaminophen (NORCO) 7.5-325 MG tab Take 1 Tablet by mouth every four hours as needed. Indications: Moderate to Moderately Severe Pain      albuterol (VENTOLIN OR PROVENTIL) 108 (90 BASE) MCG/ACT Aero Soln inhalation aerosol Inhale 2 Puffs by mouth every four hours as needed for Shortness of Breath.      sumatriptan (IMITREX) 100 MG tablet Take 100 mg by mouth Once PRN for Migraine.

## 2025-06-25 NOTE — DISCHARGE INSTRUCTIONS
Angiogram, Care After  This sheet gives you information about how to care for yourself after your procedure. Your health care provider may also give you more specific instructions. If you have problems or questions, contact your health care provider.  What can I expect after the procedure?  After the procedure, it is common to have:  Bruising and tenderness at the catheter insertion area.  A collection of blood (hematoma) at the insertion area. This may feel like a small lump under the skin at the insertion site.  Follow these instructions at home:  Insertion site care    Follow instructions from your health care provider about how to take care of your insertion site. Make sure you:  Wash your hands with soap and water before and after you change your bandage (dressing). If soap and water are not available, use hand .  Change your dressing as told by your health care provider.  Do not take baths, swim, or use a hot tub until your health care provider approves.  You may shower 24-48 hours after the procedure, or as told by your health care provider. To clean the insertion site:  Gently wash the area with plain soap and water.  Pat the area dry with a clean towel.  Do not rub the site. This may cause bleeding.  Check your insertion site every day for signs of infection. Check for:  Redness, swelling, or pain.  Fluid or blood.  Warmth.  Pus or a bad smell.  Do not apply powder or lotion to the site. Keep the site clean and dry.  Activity  Do not drive for 24 hours if you were given a sedative during your procedure.  Rest as told by your health care provider, usually for 1-2 days.  Do not lift anything that is heavier than 10 lb (4.5 kg), or the limit that you are told, until your health care provider says that it is safe.  If the insertion site was in your leg, try to avoid stairs for a few days.  Return to your normal activities as told by your health care provider, usually in about a week. Ask your health  "care provider what activities are safe for you.  General instructions  If your insertion site starts bleeding, lie flat and put pressure on the site. If the bleeding does not stop, get help right away. This is a medical emergency.  Take over-the-counter and prescription medicines only as told by your health care provider.  Drink enough fluid to keep your urine pale yellow. This helps flush the contrast dye from your body.  Keep all follow-up visits as told by your health care provider. This is important.  Contact a health care provider if:  You have a fever or chills.  You have redness, swelling, or pain around your insertion site.  You have fluid or blood coming from your insertion site.  Your insertion site feels warm to the touch.  You have pus or a bad smell coming from your insertion site.  You have more bruising around the insertion site.  Get help right away if you have:  A problem with the insertion area, such as:  The area swells fast or bleeds even after you apply pressure.  The area becomes pale, cool, tingly, or numb.  Chest pain.  Trouble breathing.  A rash.  Any symptoms of a stroke. \"BE FAST\" is an easy way to remember the main warning signs of a stroke:  B - Balance. Signs are dizziness, sudden trouble walking, or loss of balance.  E - Eyes. Signs are trouble seeing or a sudden change in vision.  F - Face. Signs are sudden weakness or loss of feeling of the face, or the face or eyelid drooping on one side.  A - Arms. Signs are weakness or loss of feeling in an arm. This happens suddenly and usually on one side of the body.  S - Speech. Signs are sudden trouble speaking, slurred speech, or trouble understanding what people say.  T - Time. Time to call emergency services. Write down what time symptoms started.  You have other signs of a stroke, such as:  A sudden, severe headache with no known cause.  Nausea or vomiting.  Seizure.  These symptoms may represent a serious problem that is an emergency. Do " not wait to see if the symptoms will go away. Get medical help right away. Call your local emergency services (911 in the [place] Do not drive yourself to the hospital.  Summary  It is common to have bruising and tenderness at the catheter insertion area.  Do not take baths, swim, or use a hot tub until your health care provider approves. You may shower 24-48 hours after the procedure or as told.  It is important to rest and drink plenty of fluids.  If the insertion site bleeds, lie flat and put pressure on the site. If the bleeding continues, get help right away. This is a medical emergency.  This information is not intended to replace advice given to you by your health care provider. Make sure you discuss any questions you have with your health care provider.  Document Revised: 10/21/2020 Document Reviewed: 10/21/2020  [email or web address] Patient Education © 2023 [email or web address]    What to Expect Post Sedation    Rest and take it easy for the first 24 hours.  A responsible adult is recommended to remain with you during that time.  It is normal to feel sleepy.  We encourage you to not do anything that requires balance, judgment or coordination.    FOR 24 HOURS DO NOT:  Drive, operate machinery or run household appliances.  Drink beer or alcoholic beverages.  Make important decisions or sign legal documents.    To avoid nausea, slowly advance diet as tolerated, avoiding spicy or greasy foods for the first day.  Add more substantial food to your diet according to your provider's instructions.  Babies can be fed formula or breast milk as soon as they are hungry.  INCREASE FLUIDS AND FIBER TO AVOID CONSTIPATION.    MILD FLU-LIKE SYMPTOMS ARE NORMAL.  YOU MAY EXPERIENCE GENERALIZED MUSCLE ACHES, THROAT IRRITATION, HEADACHE AND/OR SOME NAUSEA.

## 2025-06-25 NOTE — PROCEDURES
Cardiac Catheterization report    6/25/2025  1:44 PM    REFERRING MD: Dr. Bloch Sub PI: Dr. Shelton Luther  Procedural Assistant physician: Dr. Manohar Waters      INDICATION/PREOPERATIVE DIAGNOSIS:   THRIVE RDN Clinical Trial  Hypertension    POSTOPERATIVE DIAGNOSIS:  No contraindications to randomization into RDN clinical trial after diagnostic renal angiography  Successful randomization into the THRIVE RDN trial    RECOMMENDATIONS:  Guideline directed medical therapy   Cardiovascular Risk factor modification  4H bedrest  Follow up per study protocol.      PROCEDURES PERFORMED:  Abdominal aortogram  Selective left and selective right renal angiography  Supervision moderate sedation  Randomization into THRIVE RDN clinical trial.      FINDINGS:  I.  HEMODYNAMICS   Ao: 119/64 mmHg      II.  AORTOGRAM and RENAL ANGIOGRAPHY  Normal caliber abdominal aorta without atherosclerotic disease.  Normal single renal arterial vascular supply bilaterally.  No evidence of fibromuscular dysplasia.  No angiographically noted diameter stenosis greater than 30%.        Procedure details:  The risks and benefits of the procedure and possible intervention were explained to the patient including death, heart attack, stroke, and emergency surgery renal injury and vascular injury.  The patient was brought to the cardiac catheterization lab where the right groin was prepped and draped in the usual manner for cardiac catheterization.  The area was anesthetized with lidocaine and a 5/6 FR sheath was inserted into the right femoral artery without difficulty under direct ultrasound visualization.  Diagnostic abdominal and renal angiography was completed by the assistant physician.  A 6 Citizen of [place] sheath utilizing 6 Citizen of [place] pigtail catheter seated above the renal arteries.  Contrast was injected for distal abdominal aortogram.  Subsequently was exchanged for a JR4 diagnostic catheter for selective right and left renal angiography in the usual  fashion.  Patient was then felt suitable for randomization.  The clinical trial team proceeded with randomization per protocol.  The study then proceeded per trial designated protocol utilizing standard equipment.  Final angiographic films were obtained of the bilateral renal arteries showing no significant change to the end of procedure.  Subsequently the right femoral artery was angiographically evaluated and found suitable for closure and a Perclose device was deployed in usual fashion with excellent primary hemostasis.  Patient tolerated the procedure well.     Complications:  None apparent    Sedation time:  Moderate sedation directly monitored by me during the case while supervising the administration of the sedation medication by an independent trained RN to assist in the monitoring of the patient's level of consciousness and physiological status. I, the supervising physician was present the entire time from beginning of medication administration until the end of the procedure from 11:10 AM until 12:34 PM. For detailed administration records please see the moderate sedation documentation in the media tab.    Following the procedure I discussed the results with the patient who had consented to and is required to remain blinded to sham versus procedural invention.  Blinding was maintained throughout the procedure.    Shelton Luther MD, FACC, [facility]

## 2025-06-25 NOTE — PROGRESS NOTES
Postprocedure documentation  TRIVE RDN trial    Vitals:    06/25/25 1405   BP: (!) 124/92   Pulse: 75   Resp: 18   Temp:    SpO2: 97%     Physical Exam  Vitals reviewed.   Constitutional:       General: He is not in acute distress.     Appearance: Normal appearance.   HENT:      Head: Normocephalic and atraumatic.      Mouth/Throat:      Mouth: Mucous membranes are moist.   Eyes:      Extraocular Movements: Extraocular movements intact.      Pupils: Pupils are equal, round, and reactive to light.   Cardiovascular:      Rate and Rhythm: Normal rate and regular rhythm.      Pulses: Normal pulses.      Heart sounds: Normal heart sounds. No murmur heard.  Pulmonary:      Effort: Pulmonary effort is normal.      Breath sounds: Normal breath sounds.   Abdominal:      General: Abdomen is flat. Bowel sounds are normal. There is no distension.      Palpations: Abdomen is soft.      Tenderness: There is no abdominal tenderness.   Musculoskeletal:         General: No swelling.      Right lower leg: No edema.      Left lower leg: No edema.   Skin:     General: Skin is warm and dry.      Capillary Refill: Capillary refill takes less than 2 seconds.   Neurological:      General: No focal deficit present.      Mental Status: He is alert and oriented to person, place, and time.       Shelton Luther MD, FACC, [facility]  Interventional Cardiology  [facility]

## 2025-06-25 NOTE — OR NURSING
1250 - patient arrived to pacu.  2 identifiers verified, attached to monitors, alarms/parameters verified, and received report.  Right groin site assessed with cath lab RN.  Site is CDI soft with gauze and tegaderm dressing in place.  Oriented to unit and plan of care discussed.       1340 patient tolerating oral intake    1345 research coordinator at bedside.     1530 friend updated patient in recovery    1550 APRN at bedside, patient ok to dc after bedrest.    1650 4 hour bed rest complete, patient sat up in rney    1700 patient ambulated in unit, groin site post walk CDI soft no bleeding      1730 patient ready for discharge. All lines and monitors discontinued. Reviewed discharge paperwork with pt and friend. Discussed diet, activity, medications, follow up care and worsening symptoms. No questions at this time. Groin site CDI, soft, no bleeding.     1743 patient dc home via wheelchair by RN to friend

## 2025-06-25 NOTE — H&P
History:  Primary Diagnosis: Resistant Hypertension plan for THRIVE Renal Denervation      HPI:  55 years old male patient of Dr. Bloch with significant history of  resistant Hypertension on thrive study with Dr. Bloch, NATALIE on CPAP,  vertigo and migraines. He has been off hypertensive medications .     Currently patient denies chest pain, shortness of breath or palpitations.     CT abdomen   5/27/2025  The renal arteries are single bilaterally and are patent. No calcification. No visible stenosis. The right renal artery distally shows serrated irregularity suggesting possible fibromuscular dysplasia (coronal image 79, 80). This involves the upper pole   branch.    Ride home is [place] (friend)   NPO yes   NO Allergy to contrast   No upcoming surgery     Medical history: Past Medical HistoryPast Medical History:  Diagnosis Date    Arthritis     back, arm    ASTHMA     Backpain 01/01/1991    Bowel habit changes 2015    diarrhea    Breath shortness 01/2015    in the past    Dental disorder 2015    rotting away - partial denture    Elbow injury     Fibromyalgia     Head injury 01/01/2004    Heart burn 2017    Hemorrhagic disorder (HCC)     gums    Hypertension 7/08/2020    185/109    Indigestion 2016    Migraine with aura     NATALIE (obstructive sleep apnea) 01/30/2012    cpap    Pneumonia 01/01/2004    Psychiatric problem 2002    bipolar, depression    Restless leg syndrome     Sleep apnea 2015    Snoring 2010    Vertigo        Surgical history: Past Surgical History[1]    Social history: Tobacco Use History[2]   Social History     Substance and Sexual Activity   Alcohol Use Not Currently    Comment: I rarely drink. Maybe if im at [place], [employer], [employer] or [employer]      Social History     Substance and Sexual Activity   Drug Use No        Family history:   Family History   Problem Relation Age of Onset    Cancer Paternal Grandmother         Breast Cancer & Cataracts    Breast Cancer Maternal Grandmother     Hypertension Maternal  Grandmother     Stroke Maternal Grandmother     Autism Daughter         She is fine now she is  and has a child living in [place]       Allergies: Allergies[3]    Home medications: Current Medications[4]    Review of Systems:  Review of Systems   Constitutional:  Negative for malaise/fatigue.   Eyes:  Negative for blurred vision and double vision.   Respiratory:  Negative for cough, shortness of breath and wheezing.    Cardiovascular:  Negative for chest pain, palpitations, orthopnea and leg swelling.   Musculoskeletal:  Negative for falls.   Neurological:  Negative for dizziness, focal weakness, loss of consciousness, weakness and headaches.   All other systems reviewed and are negative.      Physical Examination:  Physical Exam  Vitals reviewed.   Constitutional:       Appearance: Normal appearance. He is not diaphoretic.   Cardiovascular:      Rate and Rhythm: Normal rate and regular rhythm.      Heart sounds: No murmur heard.  Pulmonary:      Effort: Pulmonary effort is normal.      Breath sounds: Normal breath sounds.   Musculoskeletal:      Right lower leg: No edema.      Left lower leg: No edema.   Neurological:      Mental Status: He is alert and oriented to person, place, and time.         Impression:  # Resistant Hypertension   # Thrive Study for Renal denervation     Plan:  Plan for Bilateral Renal Denervation     Kaylynn BOYD, Cardiology  [facility]  [facility]  [address] [address]  Phone: [phone]  Fax: [phone]               [1]   Past Surgical History:  Procedure Laterality Date    OTHER ORTHOPEDIC SURGERY Left 02/28/2018    shoulder repair    TURBINOPLASTY Right 06/28/2016    Procedure: TURBINOPLASTY;  Surgeon: Zaki Yu M.D.;  Location: SURGERY SAME DAY [facility];  Service:     OTHER  01/01/2006    Nasal - deviated septum    NO PERTINENT PAST SURGICAL HISTORY  12/01/2021     Excision chest wall mass ( lipoma ) right side of chest    OTHER  03/05/2021    colonoscopy    OTHER ABDOMINAL SURGERY  2/10/2020    A few scattered thin Erosions in the lower third of the Esophagus Compatible with Esophagitis. (Biopsy) , 4mm shallow ulcer with black heme in the antrum. (Biopsy) & eryththema & scattered whitish erosion in the first part of the duodenum & second part of   [2]   Social History  Tobacco Use   Smoking Status Never    Passive exposure: Past   Smokeless Tobacco Never   [3] No Known Allergies  [4] No current facility-administered medications for this encounter.

## 2025-06-25 NOTE — RESEARCH NOTE
Name: Singh Torres   MRN: [id number]  Participant ID:  [id number]  : 1970  Visit Date/Time: 2025 8:00AM    Study:      [id number] - [id number]   Status Consented/Enrolled (2025)   Active Start Date 25   Participant ID [id number]   Coordinator Letty Villarreal; Suzanne Pimentel; Letty Erazo; Fátima Olson   IRB [id number]   NCT [id number]    Michael J Bloch, M.D.     Re-Consent note:    Participation in the THRIVE clinical trial was discussed with patient again today. All changes from previously signed consent in the study purpose and procedures were explained.  Subject was given ample time to review the consent and all questions were answered to their satisfaction. Patient aware that the clinical trial is voluntary and they may withdraw consent at any time without affecting the level of care they receive.  Subject signed consent V4 without coercion and undue influence and was given a copy of the signed consent. No study-related procedures took place prior to consenting and all assessments were conducted per protocol.     Study Note:  Renal Angiogram, THRIVE Renal Denervation/or Sham procedure, Pre-procedure and Post-procedure physical exam conducted today by Dr. Luther. See his note.    Pre and Post EKG ready by Dr Waters, no significant changes.    Pt confirms remaining off all HTN medications.    Blinding assessment: Pt reports thinking he got the procedure.    Confirmed pt family contact information.    Home BP machine and new diary given to patient.     Post Procedure Vitals:Taken after resting >5 mins using study provided machine at 1:46pm  Average /93 76  No Orthostatic assessment due to patient requirement to lay flat for 4+hours.      Con Meds and Med Hx reviewed:  Please see procedure notes for todays given medications    Labs:                 Recent Results (from the past 2 weeks)   COMP METABOLIC PANEL    Collection Time: 25   8:52 AM   Result Value Ref Range    Sodium 142 135 - 145 mmol/L    Potassium 3.8 3.6 - 5.5 mmol/L    Chloride 110 96 - 112 mmol/L    Co2 20 20 - 33 mmol/L    Anion Gap 12.0 7.0 - 16.0    Glucose 93 65 - 99 mg/dL    Bun 17 8 - 22 mg/dL    Creatinine 1.13 0.50 - 1.40 mg/dL    Calcium 8.7 8.5 - 10.5 mg/dL    Correct Calcium 8.5 8.5 - 10.5 mg/dL    AST(SGOT) 17 12 - 45 U/L    ALT(SGPT) 8 2 - 50 U/L    Alkaline Phosphatase 86 30 - 99 U/L    Total Bilirubin 0.4 0.1 - 1.5 mg/dL    Albumin 4.3 3.2 - 4.9 g/dL    Total Protein 6.9 6.0 - 8.2 g/dL    Globulin 2.6 1.9 - 3.5 g/dL    A-G Ratio 1.7 g/dL   CBC WITHOUT DIFFERENTIAL    Collection Time: 25  8:52 AM   Result Value Ref Range    WBC 4.4 (L) 4.8 - 10.8 K/uL    RBC 4.69 (L) 4.70 - 6.10 M/uL    Hemoglobin 12.9 (L) 14.0 - 18.0 g/dL    Hematocrit 39.5 (L) 42.0 - 52.0 %    MCV 84.2 81.4 - 97.8 fL    MCH 27.5 27.0 - 33.0 pg    MCHC 32.7 32.3 - 36.5 g/dL    RDW 44.9 35.9 - 50.0 fL    Platelet Count 243 164 - 446 K/uL    MPV 9.9 9.0 - 12.9 fL   APTT    Collection Time: 25  8:52 AM   Result Value Ref Range    APTT 31.1 24.7 - 36.0 sec   PT    Collection Time: 25  8:52 AM   Result Value Ref Range    PT 13.7 12.0 - 14.6 sec    INR 1.05 0.87 - 1.13   ESTIMATED GFR    Collection Time: 25  8:52 AM   Result Value Ref Range    GFR (CKD-EPI) 76 >60 mL/min/1.73 m 2   EKG    Collection Time: 25  9:52 AM   Result Value Ref Range    Report       [facility]    Test Date:  2025  Pt Name:    ERNESTO PATTERSON                 Department: [facility]  MRN:        [id number]                      Room:  Gender:     Male                         Technician: [facility]  :        1970                   Requested By:JEANMARIE STANTON  Order #:    [id number]                    Reading MD: Manohar Waters MD    Measurements  Intervals                                Axis  Rate:       67                           P:          54  CO:         157                          QRS:         23  QRSD:       98                           T:          32  QT:         369  QTc:        390    Interpretive Statements  Sinus rhythm  Compared to ECG 10/11/2019 11:43:42  No significant changes  Electronically Signed On 2025 09:52:11 PDT by Manohar Waters MD     POCT activated clotting time device results    Collection Time: 25 11:49 AM   Result Value Ref Range    Istat Activated Clotting Time 233 (H) 74 - 137 sec   POCT activated clotting time device results    Collection Time: 25 11:57 AM   Result Value Ref Range    Istat Activated Clotting Time 291 (H) 74 - 137 sec   POCT activated clotting time device results    Collection Time: 25 12:28 PM   Result Value Ref Range    Istat Activated Clotting Time 354 (H) 74 - 137 sec   EKG    Collection Time: 25  3:18 PM   Result Value Ref Range    Report       [facility]    Test Date:  2025  Pt Name:    ERNESTO PATTERSON                 Department: [facility]  MRN:        [id number]                      Room:       [facility]  Gender:     Male                         Technician: JORGE  :        1970                   Requested By:MARQUITA ARRIAGA  Order #:    [id number]                    Reading MD:    Measurements  Intervals                                Axis  Rate:       69                           P:          59  NJ:         160                          QRS:        52  QRSD:       99                           T:          41  QT:         369  QTc:        396    Interpretive Statements  Sinus rhythm  Compared to ECG 2025 09:43:05  No significant changes     EKG STAT    Collection Time: 25  3:26 PM   Result Value Ref Range    Report       [facility]    Test Date:  2025  Pt Name:    ERNESTO PATTERSON                 Department: [facility]  MRN:        [id number]                      Room:       UPOOL  Gender:     Male                         Technician: JORGE  :        1970                   Requested By:JEANMRAIE RODGERS  ROMY  Order #:    [id number]                    Reading MD: Manohar Waters MD    Measurements  Intervals                                Axis  Rate:       69                           P:          59  OR:         160                          QRS:        52  QRSD:       99                           T:          41  QT:         369  QTc:        396    Interpretive Statements  Sinus rhythm  Compared to ECG 06/25/2025 09:43:05  No significant changes  Electronically Signed On 06- 15:26:08 PDT by Manohar Waters MD                 Follow-up: 1M Visit 7/21/25

## 2025-07-21 ENCOUNTER — RESEARCH ENCOUNTER (OUTPATIENT)
Dept: VASCULAR LAB | Facility: MEDICAL CENTER | Age: 55
End: 2025-07-21

## 2025-07-21 ENCOUNTER — OFFICE VISIT (OUTPATIENT)
Dept: VASCULAR LAB | Facility: MEDICAL CENTER | Age: 55
End: 2025-07-21
Attending: INTERNAL MEDICINE
Payer: MEDICAID

## 2025-07-21 VITALS
WEIGHT: 164.24 LBS | HEART RATE: 88 BPM | SYSTOLIC BLOOD PRESSURE: 123 MMHG | BODY MASS INDEX: 24.25 KG/M2 | DIASTOLIC BLOOD PRESSURE: 86 MMHG

## 2025-07-21 DIAGNOSIS — Z00.6 RESEARCH STUDY PATIENT: Primary | ICD-10-CM

## 2025-07-21 DIAGNOSIS — I10 HYPERTENSION, UNSPECIFIED TYPE: Primary | ICD-10-CM

## 2025-07-21 PROCEDURE — 99213 OFFICE O/P EST LOW 20 MIN: CPT | Performed by: INTERNAL MEDICINE

## 2025-07-21 ASSESSMENT — FIBROSIS 4 INDEX: FIB4 SCORE: 1.39

## 2025-07-21 NOTE — RESEARCH NOTE
Name: Singh Torres   MRN: [id number]  Participant ID:  [id number]  : 1970  Visit Date/Time: 2025 11:00 AM      Study:    [id number] - [id number]   Status Consented/Enrolled (2025)   Active Start Date 25   Participant ID [id number]   Coordinator Letty Villarreal; Suzanne Pimentel; Letty Erazo; Fátima Olson   IRB [id number]   [facility] [id number]    Michael J Bloch, M.D.     Study Note:   Pt reports feeling well since his procedure. No AE/SHAWN.     Home blood pressures uploaded to [employer]AB[employer]. Average 131/87 HR 79. Diary reviewed and new one given.     Pt confirms remaining off all HTN medications.     Physical Exam conducted by Dr. Bloch at 11am today, see his note. Pt does not meet escape criteria and will remain off HTN medications.      Office Vitals:Taken after resting >5 mins using study provided machine.     Vitals:    25 1116 25 1118 25 1225   BP: 127/87 128/80 123/86   BP Location: Left arm     Patient Position: Sitting     Pulse: 90 88 88   Weight: 74.5 kg (164 lb 3.9 oz)       Orthostatic evaluation: No symptoms reported  Supine BP 11:26am 127/87 HR 79  Standing BP 11:28  117/81 HR 94    Local Labs:    1M labs will be drawn tomorrow 25                 Con Meds:   No changes.     Follow-up: 2M visit 25

## 2025-07-21 NOTE — PROGRESS NOTES
BRINDA WINTERS FOLLOW UP VIIST    Date of service: 07/21/25    Visit Information: 1 mo visit    This was an In clinic visit     Patient doing well  No new complaints    Home blood pressures were reviewed    Height-5 feet 9 inches  Weight 264.2 pounds    Physical Exam  Vitals reviewed.   Constitutional:       General: He is not in acute distress.     Appearance: He is not diaphoretic.   HENT:      Head: Normocephalic and atraumatic.   Eyes:      General: No scleral icterus.     Conjunctiva/sclera: Conjunctivae normal.   Neck:      Vascular: No carotid bruit.   Cardiovascular:      Rate and Rhythm: Normal rate and regular rhythm.      Pulses: Normal pulses.      Heart sounds: Normal heart sounds. No murmur heard.  Pulmonary:      Effort: Pulmonary effort is normal. No respiratory distress.      Breath sounds: Normal breath sounds. No wheezing or rales.   Abdominal:      General: Abdomen is flat. There is no distension.      Palpations: Abdomen is soft.      Tenderness: There is no abdominal tenderness.   Musculoskeletal:      Right lower leg: No edema.      Left lower leg: No edema.   Skin:     Coloration: Skin is not pale.   Neurological:      General: No focal deficit present.      Mental Status: He is alert and oriented to person, place, and time.      Cranial Nerves: No cranial nerve deficit.      Coordination: Coordination normal.      Gait: Gait is intact. Gait normal.   Psychiatric:         Mood and Affect: Mood and affect normal.         Behavior: Behavior normal.       Plan: Follow-up per protocol    Michael Bloch, MD  [facility]

## 2025-07-22 ENCOUNTER — HOSPITAL ENCOUNTER (OUTPATIENT)
Dept: LAB | Facility: MEDICAL CENTER | Age: 55
End: 2025-07-22
Attending: INTERNAL MEDICINE
Payer: MEDICAID

## 2025-07-22 DIAGNOSIS — Z00.6 RESEARCH STUDY PATIENT: ICD-10-CM

## 2025-07-22 LAB
ALBUMIN SERPL BCP-MCNC: 4.5 G/DL (ref 3.2–4.9)
ALBUMIN/GLOB SERPL: 1.8 G/DL
ALP SERPL-CCNC: 97 U/L (ref 30–99)
ALT SERPL-CCNC: 13 U/L (ref 2–50)
ANION GAP SERPL CALC-SCNC: 10 MMOL/L (ref 7–16)
AST SERPL-CCNC: 20 U/L (ref 12–45)
BASOPHILS # BLD AUTO: 0.7 % (ref 0–1.8)
BASOPHILS # BLD: 0.03 K/UL (ref 0–0.12)
BILIRUB SERPL-MCNC: 0.3 MG/DL (ref 0.1–1.5)
BUN SERPL-MCNC: 22 MG/DL (ref 8–22)
CALCIUM ALBUM COR SERPL-MCNC: 8.4 MG/DL (ref 8.5–10.5)
CALCIUM SERPL-MCNC: 8.8 MG/DL (ref 8.5–10.5)
CHLORIDE SERPL-SCNC: 109 MMOL/L (ref 96–112)
CO2 SERPL-SCNC: 22 MMOL/L (ref 20–33)
CREAT SERPL-MCNC: 1.18 MG/DL (ref 0.5–1.4)
CREAT UR-MCNC: 101 MG/DL
EOSINOPHIL # BLD AUTO: 0.17 K/UL (ref 0–0.51)
EOSINOPHIL NFR BLD: 3.8 % (ref 0–6.9)
ERYTHROCYTE [DISTWIDTH] IN BLOOD BY AUTOMATED COUNT: 44.6 FL (ref 35.9–50)
FASTING STATUS PATIENT QL REPORTED: NORMAL
GFR SERPLBLD CREATININE-BSD FMLA CKD-EPI: 72 ML/MIN/1.73 M 2
GLOBULIN SER CALC-MCNC: 2.5 G/DL (ref 1.9–3.5)
GLUCOSE SERPL-MCNC: 98 MG/DL (ref 65–99)
HCT VFR BLD AUTO: 40.5 % (ref 42–52)
HGB BLD-MCNC: 13 G/DL (ref 14–18)
IMM GRANULOCYTES # BLD AUTO: 0.01 K/UL (ref 0–0.11)
IMM GRANULOCYTES NFR BLD AUTO: 0.2 % (ref 0–0.9)
LYMPHOCYTES # BLD AUTO: 1.5 K/UL (ref 1–4.8)
LYMPHOCYTES NFR BLD: 33.9 % (ref 22–41)
MCH RBC QN AUTO: 27.3 PG (ref 27–33)
MCHC RBC AUTO-ENTMCNC: 32.1 G/DL (ref 32.3–36.5)
MCV RBC AUTO: 84.9 FL (ref 81.4–97.8)
MICROALBUMIN UR-MCNC: <1.2 MG/DL
MICROALBUMIN/CREAT UR: NORMAL MG/G (ref 0–30)
MONOCYTES # BLD AUTO: 0.5 K/UL (ref 0–0.85)
MONOCYTES NFR BLD AUTO: 11.3 % (ref 0–13.4)
NEUTROPHILS # BLD AUTO: 2.22 K/UL (ref 1.82–7.42)
NEUTROPHILS NFR BLD: 50.1 % (ref 44–72)
NRBC # BLD AUTO: 0 K/UL
NRBC BLD-RTO: 0 /100 WBC (ref 0–0.2)
PLATELET # BLD AUTO: 232 K/UL (ref 164–446)
PMV BLD AUTO: 10.9 FL (ref 9–12.9)
POTASSIUM SERPL-SCNC: 4.8 MMOL/L (ref 3.6–5.5)
PROT SERPL-MCNC: 7 G/DL (ref 6–8.2)
RBC # BLD AUTO: 4.77 M/UL (ref 4.7–6.1)
SODIUM SERPL-SCNC: 141 MMOL/L (ref 135–145)
WBC # BLD AUTO: 4.4 K/UL (ref 4.8–10.8)

## 2025-07-22 PROCEDURE — 85025 COMPLETE CBC W/AUTO DIFF WBC: CPT

## 2025-07-22 PROCEDURE — 82043 UR ALBUMIN QUANTITATIVE: CPT

## 2025-07-22 PROCEDURE — 36415 COLL VENOUS BLD VENIPUNCTURE: CPT

## 2025-07-22 PROCEDURE — 80053 COMPREHEN METABOLIC PANEL: CPT

## 2025-07-22 PROCEDURE — 82570 ASSAY OF URINE CREATININE: CPT

## 2025-07-23 ENCOUNTER — DOCUMENTATION (OUTPATIENT)
Dept: VASCULAR LAB | Facility: MEDICAL CENTER | Age: 55
End: 2025-07-23
Payer: MEDICAID

## 2025-07-23 NOTE — PROGRESS NOTES
Reviewed blood work from 7/22 visit.   No clinically significant changes noted.    Michael Bloch, MD  [facility]

## 2025-08-12 ENCOUNTER — RESEARCH ENCOUNTER (OUTPATIENT)
Dept: VASCULAR LAB | Facility: MEDICAL CENTER | Age: 55
End: 2025-08-12
Payer: MEDICAID

## 2025-08-12 DIAGNOSIS — Z00.6 RESEARCH STUDY PATIENT: Primary | ICD-10-CM

## 2025-08-18 ENCOUNTER — OFFICE VISIT (OUTPATIENT)
Dept: SLEEP MEDICINE | Facility: MEDICAL CENTER | Age: 55
End: 2025-08-18
Attending: NURSE PRACTITIONER
Payer: MEDICAID

## 2025-08-18 VITALS
OXYGEN SATURATION: 96 % | HEIGHT: 69 IN | DIASTOLIC BLOOD PRESSURE: 100 MMHG | BODY MASS INDEX: 24.44 KG/M2 | WEIGHT: 165 LBS | HEART RATE: 86 BPM | SYSTOLIC BLOOD PRESSURE: 142 MMHG | RESPIRATION RATE: 16 BRPM

## 2025-08-18 DIAGNOSIS — G47.31 CENTRAL SLEEP APNEA: ICD-10-CM

## 2025-08-18 DIAGNOSIS — G47.33 OSA ON CPAP: Primary | ICD-10-CM

## 2025-08-18 PROCEDURE — 3080F DIAST BP >= 90 MM HG: CPT | Performed by: NURSE PRACTITIONER

## 2025-08-18 PROCEDURE — 3077F SYST BP >= 140 MM HG: CPT | Performed by: NURSE PRACTITIONER

## 2025-08-18 PROCEDURE — 99214 OFFICE O/P EST MOD 30 MIN: CPT | Performed by: NURSE PRACTITIONER

## 2025-08-18 ASSESSMENT — FIBROSIS 4 INDEX: FIB4 SCORE: 1.34

## 2025-08-23 ENCOUNTER — HOSPITAL ENCOUNTER (OUTPATIENT)
Dept: LAB | Facility: MEDICAL CENTER | Age: 55
End: 2025-08-23
Attending: INTERNAL MEDICINE
Payer: MEDICAID

## 2025-08-23 DIAGNOSIS — Z00.6 RESEARCH STUDY PATIENT: ICD-10-CM

## 2025-08-23 LAB
ALBUMIN SERPL BCP-MCNC: 4.5 G/DL (ref 3.2–4.9)
ALBUMIN/GLOB SERPL: 1.7 G/DL
ALP SERPL-CCNC: 95 U/L (ref 30–99)
ALT SERPL-CCNC: 13 U/L (ref 2–50)
ANION GAP SERPL CALC-SCNC: 10 MMOL/L (ref 7–16)
AST SERPL-CCNC: 21 U/L (ref 12–45)
BASOPHILS # BLD AUTO: 0.5 % (ref 0–1.8)
BASOPHILS # BLD: 0.02 K/UL (ref 0–0.12)
BILIRUB SERPL-MCNC: 0.2 MG/DL (ref 0.1–1.5)
BUN SERPL-MCNC: 22 MG/DL (ref 8–22)
CALCIUM ALBUM COR SERPL-MCNC: 8.2 MG/DL (ref 8.5–10.5)
CALCIUM SERPL-MCNC: 8.6 MG/DL (ref 8.5–10.5)
CHLORIDE SERPL-SCNC: 111 MMOL/L (ref 96–112)
CHOLEST SERPL-MCNC: 179 MG/DL (ref 100–199)
CO2 SERPL-SCNC: 20 MMOL/L (ref 20–33)
CREAT SERPL-MCNC: 1.45 MG/DL (ref 0.5–1.4)
CREAT UR-MCNC: 141 MG/DL
EOSINOPHIL # BLD AUTO: 0.07 K/UL (ref 0–0.51)
EOSINOPHIL NFR BLD: 1.7 % (ref 0–6.9)
ERYTHROCYTE [DISTWIDTH] IN BLOOD BY AUTOMATED COUNT: 43.7 FL (ref 35.9–50)
FASTING STATUS PATIENT QL REPORTED: NORMAL
GFR SERPLBLD CREATININE-BSD FMLA CKD-EPI: 56 ML/MIN/1.73 M 2
GGT SERPL-CCNC: 33 U/L (ref 7–51)
GLOBULIN SER CALC-MCNC: 2.6 G/DL (ref 1.9–3.5)
GLUCOSE SERPL-MCNC: 110 MG/DL (ref 65–99)
HCT VFR BLD AUTO: 39.6 % (ref 42–52)
HDLC SERPL-MCNC: 46 MG/DL
HGB BLD-MCNC: 13.1 G/DL (ref 14–18)
IMM GRANULOCYTES # BLD AUTO: 0.01 K/UL (ref 0–0.11)
IMM GRANULOCYTES NFR BLD AUTO: 0.2 % (ref 0–0.9)
LDH SERPL L TO P-CCNC: 163 U/L (ref 107–266)
LDLC SERPL CALC-MCNC: 110 MG/DL
LYMPHOCYTES # BLD AUTO: 0.74 K/UL (ref 1–4.8)
LYMPHOCYTES NFR BLD: 17.6 % (ref 22–41)
MCH RBC QN AUTO: 28.2 PG (ref 27–33)
MCHC RBC AUTO-ENTMCNC: 33.1 G/DL (ref 32.3–36.5)
MCV RBC AUTO: 85.2 FL (ref 81.4–97.8)
MICROALBUMIN UR-MCNC: <1.2 MG/DL
MICROALBUMIN/CREAT UR: NORMAL MG/G (ref 0–30)
MONOCYTES # BLD AUTO: 0.51 K/UL (ref 0–0.85)
MONOCYTES NFR BLD AUTO: 12.1 % (ref 0–13.4)
NEUTROPHILS # BLD AUTO: 2.86 K/UL (ref 1.82–7.42)
NEUTROPHILS NFR BLD: 67.9 % (ref 44–72)
NRBC # BLD AUTO: 0 K/UL
NRBC BLD-RTO: 0 /100 WBC (ref 0–0.2)
PLATELET # BLD AUTO: 211 K/UL (ref 164–446)
PMV BLD AUTO: 10.5 FL (ref 9–12.9)
POTASSIUM SERPL-SCNC: 4.3 MMOL/L (ref 3.6–5.5)
PROT SERPL-MCNC: 7.1 G/DL (ref 6–8.2)
RBC # BLD AUTO: 4.65 M/UL (ref 4.7–6.1)
SODIUM SERPL-SCNC: 141 MMOL/L (ref 135–145)
TRIGL SERPL-MCNC: 115 MG/DL (ref 0–149)
WBC # BLD AUTO: 4.2 K/UL (ref 4.8–10.8)

## 2025-08-23 PROCEDURE — 82570 ASSAY OF URINE CREATININE: CPT

## 2025-08-23 PROCEDURE — 83615 LACTATE (LD) (LDH) ENZYME: CPT

## 2025-08-23 PROCEDURE — 36415 COLL VENOUS BLD VENIPUNCTURE: CPT

## 2025-08-23 PROCEDURE — 85025 COMPLETE CBC W/AUTO DIFF WBC: CPT

## 2025-08-23 PROCEDURE — 80053 COMPREHEN METABOLIC PANEL: CPT

## 2025-08-23 PROCEDURE — 82043 UR ALBUMIN QUANTITATIVE: CPT

## 2025-08-23 PROCEDURE — 82977 ASSAY OF GGT: CPT

## 2025-08-23 PROCEDURE — 80061 LIPID PANEL: CPT

## 2025-08-25 ENCOUNTER — OFFICE VISIT (OUTPATIENT)
Dept: VASCULAR LAB | Facility: MEDICAL CENTER | Age: 55
End: 2025-08-25
Attending: INTERNAL MEDICINE
Payer: MEDICAID

## 2025-08-25 ENCOUNTER — RESEARCH ENCOUNTER (OUTPATIENT)
Dept: CARDIOLOGY | Facility: MEDICAL CENTER | Age: 55
End: 2025-08-25

## 2025-08-25 VITALS
BODY MASS INDEX: 24.41 KG/M2 | DIASTOLIC BLOOD PRESSURE: 93 MMHG | HEART RATE: 87 BPM | WEIGHT: 165.3 LBS | SYSTOLIC BLOOD PRESSURE: 131 MMHG

## 2025-08-25 VITALS
HEART RATE: 87 BPM | BODY MASS INDEX: 24.41 KG/M2 | WEIGHT: 165.3 LBS | SYSTOLIC BLOOD PRESSURE: 131 MMHG | DIASTOLIC BLOOD PRESSURE: 93 MMHG

## 2025-08-25 DIAGNOSIS — I10 HYPERTENSION, UNSPECIFIED TYPE: Primary | ICD-10-CM

## 2025-08-25 PROCEDURE — 99213 OFFICE O/P EST LOW 20 MIN: CPT | Performed by: INTERNAL MEDICINE

## 2025-08-25 PROCEDURE — 3075F SYST BP GE 130 - 139MM HG: CPT | Performed by: INTERNAL MEDICINE

## 2025-08-25 PROCEDURE — 3080F DIAST BP >= 90 MM HG: CPT | Performed by: INTERNAL MEDICINE

## 2025-08-25 ASSESSMENT — FIBROSIS 4 INDEX
FIB4 SCORE: 1.55
FIB4 SCORE: 1.55

## 2025-10-18 ENCOUNTER — APPOINTMENT (OUTPATIENT)
Dept: SLEEP MEDICINE | Facility: MEDICAL CENTER | Age: 55
End: 2025-10-18
Attending: NURSE PRACTITIONER
Payer: MEDICAID